# Patient Record
Sex: MALE | Race: WHITE | NOT HISPANIC OR LATINO | Employment: FULL TIME | URBAN - METROPOLITAN AREA
[De-identification: names, ages, dates, MRNs, and addresses within clinical notes are randomized per-mention and may not be internally consistent; named-entity substitution may affect disease eponyms.]

---

## 2019-05-06 ENCOUNTER — DOCUMENTATION ONLY (OUTPATIENT)
Dept: CARE COORDINATION | Facility: CLINIC | Age: 59
End: 2019-05-06

## 2019-05-06 ASSESSMENT — ENCOUNTER SYMPTOMS
DISTURBANCES IN COORDINATION: 0
NUMBNESS: 0
PARALYSIS: 0
SPEECH CHANGE: 0
LOSS OF CONSCIOUSNESS: 0
SMELL DISTURBANCE: 0
DIZZINESS: 0
SINUS CONGESTION: 1
TASTE DISTURBANCE: 0
SORE THROAT: 1
TINGLING: 0
TROUBLE SWALLOWING: 0
MEMORY LOSS: 1
HOARSE VOICE: 0
NECK MASS: 0
TREMORS: 0
SINUS PAIN: 0
WEAKNESS: 0
SEIZURES: 0
HEADACHES: 0

## 2019-05-08 ENCOUNTER — OFFICE VISIT (OUTPATIENT)
Dept: INTERNAL MEDICINE | Facility: CLINIC | Age: 59
End: 2019-05-08
Payer: COMMERCIAL

## 2019-05-08 VITALS
HEIGHT: 72 IN | BODY MASS INDEX: 26.68 KG/M2 | SYSTOLIC BLOOD PRESSURE: 144 MMHG | OXYGEN SATURATION: 97 % | WEIGHT: 197 LBS | DIASTOLIC BLOOD PRESSURE: 93 MMHG | HEART RATE: 68 BPM

## 2019-05-08 DIAGNOSIS — E78.5 HYPERLIPIDEMIA, UNSPECIFIED HYPERLIPIDEMIA TYPE: ICD-10-CM

## 2019-05-08 DIAGNOSIS — R06.83 SNORING: ICD-10-CM

## 2019-05-08 DIAGNOSIS — S06.0X0S CONCUSSION WITHOUT LOSS OF CONSCIOUSNESS, SEQUELA (H): Primary | ICD-10-CM

## 2019-05-08 ASSESSMENT — MIFFLIN-ST. JEOR: SCORE: 1745.46

## 2019-05-08 ASSESSMENT — PAIN SCALES - GENERAL: PAINLEVEL: NO PAIN (0)

## 2019-05-08 NOTE — PATIENT INSTRUCTIONS
[facility] Medication Refill Request Information:  * Please contact your pharmacy regarding ANY request for medication refills.  ** [facility] Prescription Fax = [phone]  * Please allow 3 business days for routine medication refills.  * Please allow 5 business days for controlled substance medication refills.     [facility] Test Result notification information:  *You will be notified with in 7-10 days of your appointment day regarding the results of your test.  If you are on MyChart you will be notified as soon as the provider has reviewed the results and signed off on them.    [facility]: [phone]

## 2019-05-08 NOTE — NURSING NOTE
Chief Complaint   Patient presents with     Establish Care     Pt is here to establish a new PCP.      Olga Daugherty LPN at 10:17 AM on 5/8/2019.

## 2019-05-08 NOTE — PROGRESS NOTES
HPI  58-year-old  presents today for evaluation of a concussion.  He was skiing in [place] 2 weeks ago and fell and had a head injury wearing a ski helmet.  He has about 5 hours of antegrade amnesia in another several hours of retrograde amnesia for the event.  He is seen in [facility] initial CT scan on one image showed a possible subarachnoid hemorrhage however repeat CT scan several hours later did not confirm this and MRI scan did not show any evidence of hemorrhage but did show some white matter changes.  Subsequently he has done well.  He has had a fatigue but he is also had a recent upper respiratory infection.  He is had improvement in the fatigue he has had no difficulty concentrating no headache no visual changes no focal neurologic symptoms numbness tingling weakness or other complaints.  Is been able to function near normally.  He otherwise has been in good health.  His wife complains of disruptive snoring.  He has no daytime hypersomnolence however he is never been observed to have apneic spells his neck measures only 16 inches by his report.  He is hypertensive today his stop bang score is 3.  He does have a history of borderline hypertension and hyperlipidemia in the past but this is been managed largely through exercise.  He has been doing good cardiovascular exercise biking and running to work several days a week.  Is been doing no strength training and we discussed this.  Past Medical History:   Diagnosis Date     Hyperlipidemia      Hypertension      Past Surgical History:   Procedure Laterality Date     NO HISTORY OF SURGERY       Family History   Problem Relation Age of Onset     Glaucoma No family hx of      Macular Degeneration No family hx of      Eye Surgery No family hx of      Retinal detachment No family hx of      Social History     Socioeconomic History     Marital status:      Spouse name: None     Number of children: None     Years of education: None     Highest  education level: None   Occupational History     None   Social Needs     Financial resource strain: None     Food insecurity:     Worry: None     Inability: None     Transportation needs:     Medical: None     Non-medical: None   Tobacco Use     Smoking status: Former Smoker     Types: Cigarettes     Last attempt to quit: 1984     Years since quittin.7     Smokeless tobacco: Never Used   Substance and Sexual Activity     Alcohol use: None     Drug use: None     Sexual activity: None   Lifestyle     Physical activity:     Days per week: None     Minutes per session: None     Stress: None   Relationships     Social connections:     Talks on phone: None     Gets together: None     Attends Sikh service: None     Active member of club or organization: None     Attends meetings of clubs or organizations: None     Relationship status: None     Intimate partner violence:     Fear of current or ex partner: None     Emotionally abused: None     Physically abused: None     Forced sexual activity: None   Other Topics Concern     None   Social History Narrative     None     Answers for HPI/ROS submitted by the patient on 2019   General Symptoms: No  Skin Symptoms: No  HENT Symptoms: Yes  EYE SYMPTOMS: No  HEART SYMPTOMS: No  LUNG SYMPTOMS: No  INTESTINAL SYMPTOMS: No  URINARY SYMPTOMS: No  REPRODUCTIVE SYMPTOMS: No  SKELETAL SYMPTOMS: No  BLOOD SYMPTOMS: No  NERVOUS SYSTEM SYMPTOMS: Yes  MENTAL HEALTH SYMPTOMS: No  Ear pain: No  Ear discharge: No  Hearing loss: No  Tinnitus: Yes  Nosebleeds: No  Congestion: Yes  Sinus pain: No  Trouble swallowing: No   Voice hoarseness: No  Mouth sores: No  Sore throat: Yes  Tooth pain: No  Gum tenderness: No  Bleeding gums: No  Change in taste: No  Change in sense of smell: No  Dry mouth: No  Hearing aid used: No  Neck lump: No  Trouble with coordination: No  Dizziness or trouble with balance: No  Fainting or black-out spells: No  Memory loss: Yes  Headache: No  Seizures:  "No  Speech problems: No  Tingling: No  Tremor: No  Weakness: No  Difficulty walking: No  Paralysis: No  Numbness: No    Exam:  BP (!) 144/93   Pulse 68   Ht 1.835 m (6' 0.24\")   Wt 89.4 kg (197 lb)   SpO2 97%   BMI 26.54 kg/m    197 lbs 0 oz  Physical Exam   The patient is alert, oriented with a clear sensorium.   Skin shows no lesions or rashes and good turgor.   Head is normocephalic and atraumatic.   Eyes show PERRLA with benign optic fundi.   Neck shows no nodes, thyromegaly or bruits.   Back is non tender.  Lungs are clear to percussion and auscultation.   Heart shows normal S1 and S2 without murmur or gallop.   Abdomen is soft and nontender without masses or organomegaly.   Extremities show no edema and no evidence of active synovitis.   Neurologic examination shows cranial nerves II-XII intact. Motor shows 5/5 strength. Reflexes are symmetric. Cerebellar testing shows normal tandem gait.  Romberg negative.    ASSESSMENT  1 concussion  2 questionable subarachnoid hemorrhage doubt this is a real finding  3 disruptive snoring  4 hypertension  5 history of hyperlipidemia needs follow-up    Plan  Him and have him see neurology and have them review his scans which will get loaded into our system.  We will defer any additional imaging or investigation regarding the purported hemorrhage to neurology.  We will have him see the sleep clinic regarding his snoring.  We discussed nonpharmacologic blood pressure control measures and will intensify his salt restriction his fruit and vegetable consumption and encouraged him to participate in a combination of strength training and cardiovascular exercise.  Encouraged him schedule physical examination following his above evaluations.  Over 45 minutes spent with patient the majority in counseling and coordinating care.    This note was completed using Dragon voice recognition software.  Although reviewed after completion, some word and grammatical errors may " occur.    Gus Estevez MD  General Internal Medicine  [facility]  [phone]

## 2019-05-11 DIAGNOSIS — E78.5 HYPERLIPIDEMIA, UNSPECIFIED HYPERLIPIDEMIA TYPE: ICD-10-CM

## 2019-05-11 LAB
CHOLEST SERPL-MCNC: 183 MG/DL
HDLC SERPL-MCNC: 40 MG/DL
LDLC SERPL CALC-MCNC: 115 MG/DL
NONHDLC SERPL-MCNC: 143 MG/DL
TRIGL SERPL-MCNC: 141 MG/DL

## 2019-06-14 ENCOUNTER — OFFICE VISIT (OUTPATIENT)
Dept: SLEEP MEDICINE | Facility: CLINIC | Age: 59
End: 2019-06-14
Payer: COMMERCIAL

## 2019-06-14 VITALS
SYSTOLIC BLOOD PRESSURE: 125 MMHG | HEIGHT: 73 IN | WEIGHT: 198 LBS | BODY MASS INDEX: 26.24 KG/M2 | HEART RATE: 67 BPM | DIASTOLIC BLOOD PRESSURE: 84 MMHG | RESPIRATION RATE: 16 BRPM | OXYGEN SATURATION: 97 %

## 2019-06-14 DIAGNOSIS — R06.83 SNORING: Primary | ICD-10-CM

## 2019-06-14 PROCEDURE — 99244 OFF/OP CNSLTJ NEW/EST MOD 40: CPT | Performed by: PHYSICIAN ASSISTANT

## 2019-06-14 ASSESSMENT — MIFFLIN-ST. JEOR: SCORE: 1749.06

## 2019-06-14 NOTE — PROGRESS NOTES
Sleep Consultation:    Date on this visit: 6/14/2019    Chris Kirkland is sent by Gus Estevez for a sleep consultation regarding snoring.    Primary Physician: Gus Estevez     Chris Kirkland reports his wife is bothered by his snoring for 5 years. His medical history is significant for elevated blood pressure and hyperlipidemia.    Chris goes to sleep at 10:30-11:00 PM during the week. He wakes up at 6:30 AM with an alarm. He occasionally hits the snooze. He falls asleep in 10 minutes.  Chris has difficulty falling asleep sporadically when work is on his mind.  He wakes up 1 time a night for 5 minutes before falling back to sleep.  Chris wakes up to go to the bathroom.  On weekends, Chris goes to sleep at 11:00 PM.  He wakes up at 7:00-8:00 AM without an alarm. He falls asleep in 10 minutes.  Patient gets an average of 7-8 hours of sleep per night. He feels refreshed when he wakes as long as he gets enough sleep.    Patient does use electronics in bed and read in bed and does not watch TV in bed. He sometimes worries in bed about work.     Chris does not do shift work.  He works day shifts.  He works as an . He lives with his wife.    Chris does snore almost every night and snoring is loud. Patient does have a regular bed partner. She goes to bed an hour before him. She often shoves him within 30 minutes of him going to bed.  He has tried Breathe Right strips. Although he feels he breathes better, his wife says it has not helped.  There is no report of gasping, snorting or witnessed apneas. They occasionally sleep separately due to his snoring.  Patient sleeps on his back and side. He starts on his right and ends up on his back.  He has no snort arousals, morning dry mouth, morning headaches, morning confusion and restless legs. Chris denies any bruxism, sleep walking, sleep talking, dream enactment, sleep paralysis, cataplexy and hypnogogic/hypnopompic  "hallucinations. His wife says he sometimes \"flops,\" but it is not clear what she means by that, possible changing positions.    Chris has difficulty breathing through his nose, denies claustrophobia, reflux at night, heartburn and depression.      Chris has gained 5-10 pounds in the last 4 years. He bikes or runs to work in the summer and runs/kayaks on weekends. He is less active in the winter. Patient describes themself as a night person.  He would prefer to go to sleep at 11:00 PM and wake up at 7:00-8:00 AM.  Patient's [place] Sleepiness score 3/24 inconsistent with daytime sleepiness.      Chris denies taking naps. He takes no inadvertant naps.  He denies dozing while driving. Patient was counseled on the importance of driving while alert, to pull over if drowsy, or nap before getting into the vehicle if sleepy.  He uses 3-4 cups/day of coffee. Last caffeine intake is usually before 3 PM.    Allergies:    Allergies   Allergen Reactions     Penicillins        Medications:    No current outpatient medications on file.       Problem List:  Patient Active Problem List    Diagnosis Date Noted     Hyperlipidemia      Priority: Medium     Cataracts, bilateral 06/09/2015     Priority: Medium     Myopic astigmatism of both eyes 06/09/2015     Priority: Medium     Presbyopia 06/09/2015     Priority: Medium        Past Medical/Surgical History:  Past Medical History:   Diagnosis Date     Hyperlipidemia      Hypertension      Past Surgical History:   Procedure Laterality Date     NO HISTORY OF SURGERY         Social History:  Social History     Socioeconomic History     Marital status:      Spouse name: Not on file     Number of children: Not on file     Years of education: Not on file     Highest education level: Not on file   Occupational History     Not on file   Social Needs     Financial resource strain: Not on file     Food insecurity:     Worry: Not on file     Inability: Not on file     Transportation " needs:     Medical: Not on file     Non-medical: Not on file   Tobacco Use     Smoking status: Former Smoker     Types: Cigarettes     Last attempt to quit: 1984     Years since quittin.8     Smokeless tobacco: Never Used   Substance and Sexual Activity     Alcohol use: Yes     Comment: 1 drink a few times per week, 2 on weekends     Drug use: Not Currently     Sexual activity: Not on file   Lifestyle     Physical activity:     Days per week: Not on file     Minutes per session: Not on file     Stress: Not on file   Relationships     Social connections:     Talks on phone: Not on file     Gets together: Not on file     Attends Christian service: Not on file     Active member of club or organization: Not on file     Attends meetings of clubs or organizations: Not on file     Relationship status: Not on file     Intimate partner violence:     Fear of current or ex partner: Not on file     Emotionally abused: Not on file     Physically abused: Not on file     Forced sexual activity: Not on file   Other Topics Concern     Parent/sibling w/ CABG, MI or angioplasty before 65F 55M? Not Asked   Social History Narrative     Not on file       Family History:  Family History   Problem Relation Age of Onset     Parkinsonism Mother      Cancer Mother         thyroid     Heart Failure Father      Sleep Apnea Brother      Obesity Brother      Diabetes Brother      Parkinsonism Sister      Diabetes Brother      Glaucoma No family hx of      Macular Degeneration No family hx of      Eye Surgery No family hx of      Retinal detachment No family hx of        Review of Systems:  A complete review of systems reviewed by me is negative with the exeption of what has been mentioned in the history of present illness.  CONSTITUTIONAL: NEGATIVE for weight gain/loss, fever, chills, sweats or night sweats, drug allergies.  EYES: NEGATIVE for changes in vision, blind spots, double vision.  ENT: NEGATIVE for ear pain, sore throat, sinus  "pain, post-nasal drip, runny nose, bloody nose  CARDIAC: NEGATIVE for fast heartbeats or fluttering in chest, chest pain or pressure, breathlessness when lying flat, swollen legs or swollen feet.  CARDIAC:  POSITIVE for  High blood pressure readings.  NEUROLOGIC: NEGATIVE headaches, weakness or numbness in the arms or legs.  DERMATOLOGIC: NEGATIVE for rashes, new moles or change in mole(s)  PULMONARY: NEGATIVE SOB at rest, SOB with activity, dry cough, productive cough, coughing up blood, wheezing or whistling when breathing.    GASTROINTESTINAL: NEGATIVE for nausea or vomitting, loose or watery stools, fat or grease in stools, constipation, abdominal pain, bowel movements black in color or blood noted.  GENITOURINARY: NEGATIVE for pain during urination, blood in urine, urinating more frequently than usual, irregular menstrual periods.  MUSCULOSKELETAL: NEGATIVE for muscle pain, bone or joint pain, swollen joints.  ENDOCRINE: NEGATIVE for increased thirst or urination, diabetes.  LYMPHATIC: NEGATIVE for swollen lymph nodes, lumps or bumps in the breasts or nipple discharge.    Physical Examination:  Vitals: /84   Pulse 67   Resp 16   Ht 1.842 m (6' 0.5\")   Wt 89.8 kg (198 lb)   SpO2 97%   BMI 26.48 kg/m      Neck Cir (cm): 40 cm    [place] Total Score 6/14/2019   Total score - [place] 3       [place]ORIA Total Score: 4 (06/14/19 1433)    GENERAL APPEARANCE: healthy, alert, no distress and cooperative  EYES: Eyes grossly normal to inspection, PERRL, conjunctivae and sclerae normal and lids and lashes normal  HENT: nose and mouth without ulcers or lesions, oropharynx crowded, soft palate very low draping and tongue base enlarged  NECK: no adenopathy, no asymmetry, masses, or scars, thyroid normal to palpation and trachea midline and normal to palpation  RESP: lungs clear to auscultation - no rales, rhonchi or wheezes  CV: regular rates and rhythm, normal S1 S2, no S3 or S4, no murmur, click or rub and no irregular " beats  LYMPHATICS: no cervical adenopathy  MS: extremities normal- no gross deformities noted  NEURO: Normal strength and tone, mentation intact, speech normal and cranial nerves 2-12 intact  Mallampati Class: IV.  Tonsillar Stage: 1  hidden by pillars.    Impression/Plan:    (R06.83) Snoring  (primary encounter diagnosis)  Comment: Mr. Kirkland presents primarily at his wife's request due to his disruptive snoring.  He snores most nights, but not every night.  He has 1-2 alcoholic beverages a few times per week.  He has not noticed to what degree that may be contributing.  His snoring is worse when he sleeps on his back.  He feels his nasal airway becomes restricted when he lays down.  Breathe Right strips have helped him breathe better, but had not seem to reduce his snoring.  He has not been observed to have pauses in breathing.  His STOP BANG score is at least 4, suggesting mild to moderately elevated risk for SHIREEN.  His risk factors include snoring, age >50 (59), neck circumference of 40 cm, male gender.  He also has occasional elevated blood pressure readings, although he has not been diagnosed or treated for hypertension.  His soft palate is low draping and approximates with his tongue.  Negative risk factors include; no significant sleepiness (ESS 3/24), no observed apnea, BMI <35 (26).   Plan: We discussed that his risk for apnea is elevated based on the STOP BANG score, even though he is not highly symptomatic.  We discussed options of evaluating his sleep with either an in lab PSG, home study, or oximetry.  He was more concerned with treating his snoring than pursuing a diagnosis of apnea.  He had some concern about risk of sudden death from untreated apnea, but was reassured that that risk is very low.  He would like to try using Flonase, positional restriction (he was shown the Slumberbump) or OTC dental appliance to see if that resolves the snoring before he undergoes further sleep  evaluation.      Literature provided regarding sleep apnea.      He will follow up with me in the future if he would like to undergo a sleep study.       Polysomnography reviewed.  Obstructive sleep apnea reviewed.  Complications of untreated sleep apnea were reviewed.  45 minutes was spent during this visit, over 50% in counseling and coordination of care.   Bennett Goltz, PA-C    CC: Gus Estevez

## 2019-06-14 NOTE — PATIENT INSTRUCTIONS
"MY TREATMENT INFORMATION FOR SLEEP APNEA-  Chris Kirkland    DOCTOR : Bennett Ezra Goltz, PA-C  SLEEP CENTER : [facility]     MY CONTACT NUMBER: [phone]    Am I having a sleep study at a sleep center?  Make sure you have an appointment for the study before you leave!    Am I having a home sleep study?  Watch this video:  https://www.Efield.com/watch?v=CteI_[email or web address]&[email or web address]  Please verify your insurance coverage with your insurance carrier    Frequently asked questions:  1. What is Obstructive Sleep Apnea (SHIREEN)? SHIREEN is the most common type of sleep apnea. Apnea means, \"without breath.\"  Apnea is most often caused by narrowing or collapse of the upper airway as muscles relax during sleep.   Almost everyone has occasional apneas. Most people with sleep apnea have had brief interruptions at night frequently for many years.  The severity of sleep apnea is related to how frequent and severe the events are.   2. What are the consequences of SHIREEN? Symptoms include: feeling sleepy during the day, snoring loudly, gasping or stopping of breathing, trouble sleeping, and occasionally morning headaches or heartburn at night.  Sleepiness can be serious and even increase the risk of falling asleep while driving. Other health consequences may include development of high blood pressure and other cardiovascular disease in persons who are susceptible. Untreated SHIREEN  can contribute to heart disease, stroke and diabetes.   3. What are the treatment options? In most situations, sleep apnea is a lifelong disease that must be managed with daily therapy. Medications are not effective for sleep apnea and surgery is generally not considered until other therapies have been tried. Your treatment is your choice . Continuous Positive Airway (CPAP) works right away and is the therapy that is effective in nearly everyone. An oral device to hold your jaw forward is usually the next most reliable option. Other options " include postioning devices (to keep you off your back), weight loss, and surgery including a tongue pacing device. There is more detail about some of these options below.    Important tips for using CPAP and similar devices   Know your equipment:  CPAP is continuous positive airway pressure that prevents obstructive sleep apnea by keeping the throat from collapsing while you are sleeping. In most cases, the device is  smart  and can slowly self-adjusts if your throat collapses and keeps a record every day of how well you are treated-this information is available to you and your care team.  BPAP is bilevel positive airway pressure that keeps your throat open and also assists each breath with a pressure boost to maintain adequate breathing.  Special kinds of BPAP are used in patients who have inadequate breathing from lung or heart disease. In most cases, the device is  smart  and can slowly self-adjusts to assist breathing. Like CPAP, the device keeps a record of how well you are treated.  Your mask is your connection to the device. You get to choose what feels most comfortable and the staff will help to make sure if fits. Here: are some examples of the different masks that are available:       Key points to remember on your journey with sleep apnea:  1. Sleep study.  PAP devices often need to be adjusted during a sleep study to show that they are effective and adjusted right.  2. Good tips to remember: Try wearing just the mask during a quiet time during the day so your body adapts to wearing it. A humidifier is recommended for comfort in most cases to prevent drying of your nose and throat. Allergy medication from your provider may help you if you are having nasal congestion.  3. Getting settled-in. It takes more than one night for most of us to get used to wearing a mask. Try wearing just the mask during a quiet time during the day so your body adapts to wearing it. A humidifier is recommended for comfort in most  cases. Our team will work with you carefully on the first day and will be in contact within 4 days and again at 2 and 4 weeks for advice and remote device adjustments. Your therapy is evaluated by the device each day.   4. Use it every night. The more you are able to sleep naturally for 7-8 hours, the more likely you will have good sleep and to prevent health risks or symptoms from sleep apnea. Even if you use it 4 hours it helps. Occasionally all of us are unable to use a medical therapy, in sleep apnea, it is not dangerous to miss one night.   5. Communicate. Call our skilled team on the number provided on the first day if your visit for problems that make it difficult to wear the device. Over 2 out of 3 patients can learn to wear the device long-term with help from our team. Remember to call our team or your sleep providers if you are unable to wear the device as we may have other solutions for those who cannot adapt to mask CPAP therapy. It is recommended that you sleep your sleep provider within the first 3 months and yearly after that if you are not having problems.   6. Use it for your health. We encourage use of CPAP masks during daytime quiet periods to allow your face and brain to adapt to the sensation of CPAP so that it will be a more natural sensation to awaken to at night or during naps. This can be very useful during the first few weeks or months of adapting to CPAP though it does not help medically to wear CPAP during wakefulness and  should not be used as a strategy just to meet guidelines.  7. Take care of your equipment. Make sure you clean your mask and tubing using directions every day and that your filter and mask are replaced as recommended or if they are not working.     BESIDES CPAP, WHAT OTHER THERAPIES ARE THERE?    Positioning Device  Positioning devices are generally used when sleep apnea is mild and only occurs on your back.This example shows a pillow that straps around the waist. It  may be appropriate for those whose sleep study shows milder sleep apnea that occurs primarily when lying flat on one's back. Preliminary studies have shown benefit but effectiveness at home may need to be verified by a home sleep test. These devices are generally not covered by medical insurance.  Examples of devices that maintain sleeping on the back to prevent snoring and mild sleep apnea.    Belt type body positioner  Http://SpePharmosa.com/    Electronic reminder  Http://nightshifttherapy.com/  Http://www.Angstro.Neocis.au/  Slumberbump      Oral Appliance  What is oral appliance therapy?  An oral appliance device fits on your teeth at night like a retainer used after having braces. The device is made by a specialized dentist and requires several visits over 1-2 months before a manufactured device is made to fit your teeth and is adjusted to prevent your sleep apnea. Once an oral device is working properly, snoring should be improved. A home sleep test may be recommended at that time if to determine whether the sleep apnea is adequately treated.       Some things to remember:  -Oral devices are often, but not always, covered by your medical insurance. Be sure to check with your insurance provider.   -If you are referred for oral therapy, you will be given a list of specialized dentists to consider or you may choose to visit the Web site of the American Academy of Dental Sleep Medicine  -Oral devices are less likely to work if you have severe sleep apnea or are extremely overweight.     More detailed information  An oral appliance is a small acrylic device that fits over the upper and lower teeth  (similar to a retainer or a mouth guard). This device slightly moves jaw forward, which moves the base of the tongue forward, opens the airway, improves breathing for effective treat snoring and obstructive sleep apnea in perhaps 7 out of 10 people .  The best working devices are custom-made by a dental device   after a mold is made of the teeth 1, 2, 3.  When is an oral appliance indicated?  Oral appliance therapy is recommended as a first-line treatment for patients with primary snoring, mild sleep apnea, and for patients with moderate sleep apnea who prefer appliance therapy to use of CPAP4, 5. Severity of sleep apnea is determined by sleep testing and is based on the number of respiratory events per hour of sleep.   How successful is oral appliance therapy?  The success rate of oral appliance therapy in patients with mild sleep apnea is 75-80% while in patients with moderate sleep apnea it is 50-70%. The chance of success in patients with severe sleep apnea is 40-50%. The research also shows that oral appliances have a beneficial effect on the cardiovascular health of SHIREEN patients at the same magnitude as CPAP therapy7.  Oral appliances should be a second-line treatment in cases of severe sleep apnea, but if not completely successful then a combination therapy utilizing CPAP plus oral appliance therapy may be effective. Oral appliances tend to be effective in a broad range of patients although studies show that the patients who have the highest success are females, younger patients, those with milder disease, and less severe obesity. 3, 6.   Finding a dentist that practices dental sleep medicine  Specific training is available through the American Academy of Dental Sleep Medicine for dentists interested in working in the field of sleep. To find a dentist who is educated in the field of sleep and the use of oral appliances, near you, visit the Web site of the American Academy of Dental Sleep Medicine.    References  1. Lizzeth et al. Objectively measured vs self-reported compliance during oral appliance therapy for sleep-disordered breathing. Chest 2013; 144(5): 3079-1648.  2. Stacy et al. Objective measurement of compliance during oral appliance therapy for sleep-disordered breathing. Thorax 2013; 68(1):  91-96.  3. Mary et al. Mandibular advancement devices in 620 men and women with SHIREEN and snoring: tolerability and predictors of treatment success. Chest 2004; 125: 0725-0074.  4. Juju et al. Oral appliances for snoring and SHIREEN: a review. Sleep 2006; 29: 244-262.  5. Mike et al. Oral appliance treatment for SHIREEN: an update. J Clin Sleep Med 2014; 10(2): 215-227.  6. Venus et al. Predictors of OSAH treatment outcome. J Dent Res 2007; 86: 6675-9559.  Weight Loss:    Weight loss is a long-term strategy that may improve sleep apnea in some patients.    Weight management is a personal decision and the decision should be based on your interest and the potential benefits.  If you are interested in exploring weight loss strategies, the following discussion covers the impact on weight loss on sleep apnea and the approaches that may be successful.    Being overweight does not necessarily mean you will have health consequences.  Those who have BMI over 35 or over 27 with existing medical conditions carries greater risk.   Weight loss decreases severity of sleep apnea in most people with obesity. For those with mild obesity who have developed snoring with weight gain, even 15-30 pound weight loss can improve and occasionally eliminate sleep apnea.  Structured and life-long dietary and health habits are necessary to lose weight and keep healthier weight levels.     Though there may be significant health benefits from weight loss, long-term weight loss is very difficult to achieve- studies show success with dietary management in less than 10% of people. In addition, substantial weight loss may require years of dietary control and may be difficult if patients have severe obesity. In these cases, surgical management may be considered.  Finally, older individuals who have tolerated obesity without health complications may be less likely to benefit from weight loss strategies.      Your BMI is Body mass index is  26.48 kg/m .  Weight management is a personal decision.  If you are interested in exploring weight loss strategies, the following discussion covers the approaches that may be successful. Body mass index (BMI) is one way to tell whether you are at a healthy weight, overweight, or obese. It measures your weight in relation to your height.  A BMI of 18.5 to 24.9 is in the healthy range. A person with a BMI of 25 to 29.9 is considered overweight, and someone with a BMI of 30 or greater is considered obese. More than two-thirds of American adults are considered overweight or obese.  Being overweight or obese increases the risk for further weight gain. Excess weight may lead to heart disease and diabetes.  Creating and following plans for healthy eating and physical activity may help you improve your health.  Weight control is part of healthy lifestyle and includes exercise, emotional health, and healthy eating habits. Careful eating habits lifelong are the mainstay of weight control. Though there are significant health benefits from weight loss, long-term weight loss with diet alone may be very difficult to achieve- studies show long-term success with dietary management in less than 10% of people. Attaining a healthy weight may be especially difficult to achieve in those with severe obesity. In some cases, medications, devices and surgical management might be considered.  What can you do?  If you are overweight or obese and are interested in methods for weight loss, you should discuss this with your provider.     Consider reducing daily calorie intake by 500 calories.     Keep a food journal.     Avoiding skipping meals, consider cutting portions instead.    Diet combined with exercise helps maintain muscle while optimizing fat loss. Strength training is particularly important for building and maintaining muscle mass. Exercise helps reduce stress, increase energy, and improves fitness. Increasing exercise without diet  control, however, may not burn enough calories to loose weight.       Start walking three days a week 10-20 minutes at a time    Work towards walking thirty minutes five days a week     Eventually, increase the speed of your walking for 1-2 minutes at time    In addition, we recommend that you review healthy lifestyles and methods for weight loss available through the National Institutes of Health patient information sites:  http://win.niddk.nih.gov/publications/index.htm    And look into health and wellness programs that may be available through your health insurance provider, employer, local community center, or chet club.    Weight management plan: Discussed healthy diet and exercise guidelines  Surgery:  Surgery for obstructive sleep apnea is considered generally only when other therapies fail to work. Surgery may be discussed with you if you are having a difficult time tolerating CPAP and or when there is an abnormal structure that requires surgical correction.  Nose and throat surgeries often enlarge the airway to prevent collapse.  Most of these surgeries create pain for 1-2 weeks and up to half of the most common surgeries are not effective throughout life.  You should carefully discuss the benefits and drawbacks to surgery with your sleep provider and surgeon to determine if it is the best solution for you.   More information  Surgery for SHIREEN is directed at areas that are responsible for narrowing or complete obstruction of the airway during sleep.  There are a wide range of procedures available to enlarge and/or stabilize the airway to prevent blockage of breathing in the three major areas where it can occur: the palate, tongue, and nasal regions.  Successful surgical treatment depends on the accurate identification of the factors responsible for obstructive sleep apnea in each person.  A personalized approach is required because there is no single treatment that works well for everyone.  Because of  anatomic variation, consultation with an examination by a sleep surgeon is a critical first step in determining what surgical options are best for each patient.  In some cases, examination during sedation may be recommended in order to guide the selection of procedures.  Patients will be counseled about risks and benefits as well as the typical recovery course after surgery. Surgery is typically not a cure for a person s SHIREEN.  However, surgery will often significantly improve one s SHIREEN severity (termed  success rate ).  Even in the absence of a cure, surgery will decrease the cardiovascular risk associated with OSA7; improve overall quality of life8 (sleepiness, functionality, sleep quality, etc).  Palate Procedures:  Patients with SHIREEN often have narrowing of their airway in the region of their tonsils and uvula.  The goals of palate procedures are to widen the airway in this region as well as to help the tissues resist collapse.  Modern palate procedure techniques focus on tissue conservation and soft tissue rearrangement, rather than tissue removal.  Often the uvula is preserved in this procedure. Residual sleep apnea is common in patient after pharyngoplasty with an average reduction in sleep apnea events of 33%2.    Tongue Procedures:  ExamWhile patients are awake, the muscles that surround the throat are active and keep this region open for breathing. These muscles relax during sleep, allowing the tongue and other structures to collapse and block breathing.  There are several different tongue procedures available.  Selection of a tongue base procedure depends on characteristics seen on physical exam.  Generally, procedures are aimed at removing bulky tissues in this area or preventing the back of the tongue from falling back during sleep.  Success rates for tongue surgery range from 50-62%3.  Hypoglossal Nerve Stimulation:  Hypoglossal nerve stimulation has recently received approval from the [place] Food  and Drug Administration for the treatment of obstructive sleep apnea.  This is based on research showing that the system was safe and effective in treating sleep apnea6.  Results showed that the median AHI score decreased 68%, from 29.3 to 9.0. This therapy uses an implant system that senses breathing patterns and delivers mild stimulation to airway muscles, which keeps the airway open during sleep.  The system consists of three fully implanted components: a small generator (similar in size to a pacemaker), a breathing sensor, and a stimulation lead.  Using a small handheld remote, a patient turns the therapy on before bed and off upon awakening.    Candidates for this device must be greater than 22 years of age, have moderate to severe SHIREEN (AHI between 20-65), BMI less than 32, have tried CPAP/oral appliance without success, and have appropriate upper airway anatomy (determined by a sleep endoscopy performed by Dr. Sidhu).  Hypoglossal Nerve Stimulation Pathway:    The sleep surgeon s office will work with the patient through the insurance prior-authorization process (including communications and appeals).    Nasal Procedures:  Nasal obstruction can interfere with nasal breathing during the day and night.  Studies have shown that relief of nasal obstruction can improve the ability of some patients to tolerate positive airway pressure therapy for obstructive sleep apnea1.  Treatment options include medications such as nasal saline, topical corticosteroid and antihistamine sprays, and oral medications such as antihistamines or decongestants. Non-surgical treatments can include external nasal dilators for selected patients. If these are not successful by themselves, surgery can improve the nasal airway either alone or in combination with these other options.  Combination Procedures:  Combination of surgical procedures and other treatments may be recommended, particularly if patients have more than one area of narrowing  or persistent positional disease.  The success rate of combination surgery ranges from 66-80%2,3.    References  1. Haroldo BENTON. The Role of the Nose in Snoring and Obstructive Sleep Apnoea: An Update.  Eur Arch Otorhinolaryngol. 2011; 268: 1365-73.  2.  Liudmila SM; Ofelia JA; Yudith JR; Pallanch JF; Gordo MB; Riccardo SG; Corky MORALES. Surgical modifications of the upper airway for obstructive sleep apnea in adults: a systematic review and meta-analysis. SLEEP 2010;33(10):3538-9899. Darshan ZAMORA. Hypopharyngeal surgery in obstructive sleep apnea: an evidence-based medicine review.  Arch Otolaryngol Head Neck Surg. 2006 Feb;132(2):206-13.  3. Nick YH1, Abhishek Y, Willian DEV. The efficacy of anatomically based multilevel surgery for obstructive sleep apnea. Otolaryngol Head Neck Surg. 2003 Oct;129(4):327-35.  4. Darshan ZAMORA, Goldberg A. Hypopharyngeal Surgery in Obstructive Sleep Apnea: An Evidence-Based Medicine Review. Arch Otolaryngol Head Neck Surg. 2006 Feb;132(2):206-13.  5. Mercy PJ et al. Upper-Airway Stimulation for Obstructive Sleep Apnea.  N Engl J Med. 2014 Jan 9;370(2):139-49.  6. Suman Y et al. Increased Incidence of Cardiovascular Disease in Middle-aged Men with Obstructive Sleep Apnea. Am J Respir Crit Care Med; 2002 166: 159-165  7. Mohamud EM et al. Studying Life Effects and Effectiveness of Palatopharyngoplasty (SLEEP) study: Subjective Outcomes of Isolated Uvulopalatopharyngoplasty. Otolaryngol Head Neck Surg. 2011; 144: 623-631.

## 2019-06-14 NOTE — NURSING NOTE
"Chief Complaint   Patient presents with     Sleep Problem     Snoring       Initial /84   Pulse 67   Resp 16   Ht 1.842 m (6' 0.5\")   Wt 89.8 kg (198 lb)   SpO2 97%   BMI 26.48 kg/m   Estimated body mass index is 26.48 kg/m  as calculated from the following:    Height as of this encounter: 1.842 m (6' 0.5\").    Weight as of this encounter: 89.8 kg (198 lb).    Medication Reconciliation: complete     ESS 3  Neck 40cm  Ilana Wood        "

## 2019-06-25 ENCOUNTER — TELEPHONE (OUTPATIENT)
Dept: PHYSICAL MEDICINE AND REHAB | Facility: CLINIC | Age: 59
End: 2019-06-25

## 2019-06-26 ENCOUNTER — OFFICE VISIT (OUTPATIENT)
Dept: PHYSICAL MEDICINE AND REHAB | Facility: CLINIC | Age: 59
End: 2019-06-26
Payer: COMMERCIAL

## 2019-06-26 VITALS
OXYGEN SATURATION: 95 % | HEART RATE: 61 BPM | SYSTOLIC BLOOD PRESSURE: 132 MMHG | BODY MASS INDEX: 25.55 KG/M2 | WEIGHT: 191 LBS | DIASTOLIC BLOOD PRESSURE: 88 MMHG

## 2019-06-26 DIAGNOSIS — R41.9: ICD-10-CM

## 2019-06-26 DIAGNOSIS — S06.9X0A MILD TRAUMATIC BRAIN INJURY, WITHOUT LOSS OF CONSCIOUSNESS, INITIAL ENCOUNTER (H): Primary | ICD-10-CM

## 2019-06-26 DIAGNOSIS — Z78.9: ICD-10-CM

## 2019-06-26 ASSESSMENT — ANXIETY QUESTIONNAIRES
4. TROUBLE RELAXING: NOT AT ALL
GAD7 TOTAL SCORE: 0
GAD7 TOTAL SCORE: 0
7. FEELING AFRAID AS IF SOMETHING AWFUL MIGHT HAPPEN: NOT AT ALL
2. NOT BEING ABLE TO STOP OR CONTROL WORRYING: NOT AT ALL
6. BECOMING EASILY ANNOYED OR IRRITABLE: NOT AT ALL
GAD7 TOTAL SCORE: 0
5. BEING SO RESTLESS THAT IT IS HARD TO SIT STILL: NOT AT ALL
3. WORRYING TOO MUCH ABOUT DIFFERENT THINGS: NOT AT ALL
7. FEELING AFRAID AS IF SOMETHING AWFUL MIGHT HAPPEN: NOT AT ALL
1. FEELING NERVOUS, ANXIOUS, OR ON EDGE: NOT AT ALL

## 2019-06-26 ASSESSMENT — PAIN SCALES - GENERAL: PAINLEVEL: NO PAIN (0)

## 2019-06-26 ASSESSMENT — PATIENT HEALTH QUESTIONNAIRE - PHQ9
SUM OF ALL RESPONSES TO PHQ QUESTIONS 1-9: 0
SUM OF ALL RESPONSES TO PHQ QUESTIONS 1-9: 0

## 2019-06-26 NOTE — NURSING NOTE
Chief Complaint   Patient presents with     Head Injury     concussion w/LOC 4/7/2019- Ski accident       Vitals:    06/26/19 0657   BP: 132/88   Pulse: 61   SpO2: 95%   Weight: 86.6 kg (191 lb)       Body mass index is 25.55 kg/m .      CONCUSSION SYMPTOMS ASSESSMENT 6/26/2019   Headache or Pressure In Head 0 - none   Upset Stomach or Throwing Up 0 - none   Problems with Balance 0 - none   Feeling Dizzy 0 - none   Sensitivity to Light 0 - none   Sensitivity to Noise 2 - mild to moderate   Mood Changes 0 - none   Feeling sluggish, hazy, or foggy 0 - none   Trouble Concentrating, Lack of Focus 0 - none   Motion Sickness 0 - none   Vision Changes 0 - none   Memory Problems 0 - none   Feeling Confused 0 - none   Neck Pain 0 - none   Trouble Sleeping 0 - none   Total Number of Symptoms 1   Symptom Severity Score 2     MONIQUE-7 SCORE 6/26/2019   Total Score 0 (minimal anxiety)   Total Score 0     PHQ-9 SCORE 6/26/2019   PHQ-9 Total Score MyChart 0   PHQ-9 Total Score 0

## 2019-06-26 NOTE — PROGRESS NOTES
"[facility] PM&R Clinic - New Patient Note   Chris Kirkland  [id number]  Date of Evaluation: 6/26/2019  Referring Physician: Dr. Estevez  Reason for consult: Concussion  HPI:  Chris Kirkland is a 58 y/o M with a PMH of HLD and HTN who presents to the PM&R clinic today for evaluation of a head injury.  Accident occurred in early April during ski trip in [place].  He says he recalls loading the van to drive to the ski [place] in [facility] from [place], and then has no recollection until after the incident.  Per report he was on the second run of the day when he fell, although the incident itself was unwitnessed.  Was wearing a helmet.  Time of retrograde amnesia was about 3 hours.   He was talking after the incident and doubts any LOC occurred.   He was evaluated by  and was about to be released, however he appeared \"wobbly\" on his feet upon standing so he was then taken down the hill on a sled and taken to a local hospital.  His first recollection after the injury was in the hospital, a period of about 5-6 hours of anterograde amnesia.  A CT head was performed which showed a probable  R SAH, but repeat showed \"decreased conspicuity of the previously seen SAH\".  An MRI was then done which did not demonstrate any evidence of bleeding, but did show trace supratentorial white matter disease which was nonspecific and likely related to chronic microvascular ischemic gliosis.  He stayed in the hospital overnight for observation and says a repeat image was recommended in the future to follow up.  His main reason for the visit today is to evaluate if repeat imaging is required.    After the injury Mr. Kirkland reported high levels of fatigue, and cognitive slowing.  He is an  and returned to work shortly after, but did notice the speed of his work was slower.  However he has progressed very well, and currently denies any residual symptoms related to his injury.  Feels back to baseline.  " Denies HAs, neck pain, balance problems, photophobia, visual changes, cognitive deficits, depression, anxiety, increased irritability, or any other symptom.  He does report tinnitus, however this is chronic and not any worse than baseline.  He reports no difficulty in concentration or ability to perform work duties/cases.      PMH:  Past Medical History:   Diagnosis Date     Hyperlipidemia      Hypertension      PSH:  Past Surgical History:   Procedure Laterality Date     NO HISTORY OF SURGERY       Allergies:  Allergies   Allergen Reactions     Penicillins      Medications:  No current outpatient medications on file.     No current facility-administered medications for this visit.      Social History:  Social History     Socioeconomic History     Marital status:      Spouse name: Not on file     Number of children: Not on file     Years of education: Not on file     Highest education level: Not on file   Occupational History     Not on file   Social Needs     Financial resource strain: Not on file     Food insecurity:     Worry: Not on file     Inability: Not on file     Transportation needs:     Medical: Not on file     Non-medical: Not on file   Tobacco Use     Smoking status: Former Smoker     Types: Cigarettes     Last attempt to quit: 1984     Years since quittin.8     Smokeless tobacco: Never Used   Substance and Sexual Activity     Alcohol use: Yes     Comment: 1 drink a few times per week, 2 on weekends     Drug use: Not Currently     Sexual activity: Not on file   Lifestyle     Physical activity:     Days per week: Not on file     Minutes per session: Not on file     Stress: Not on file   Relationships     Social connections:     Talks on phone: Not on file     Gets together: Not on file     Attends Roman [employer] service: Not on file     Active member of club or organization: Not on file     Attends meetings of clubs or organizations: Not on file     Relationship status: Not on file      Intimate partner violence:     Fear of current or ex partner: Not on file     Emotionally abused: Not on file     Physically abused: Not on file     Forced sexual activity: Not on file   Other Topics Concern     Parent/sibling w/ CABG, MI or angioplasty before 65F 55M? Not Asked   Social History Narrative     Not on file     Review of Systems - History obtained from chart review and the patient  General ROS: negative for - chills, fatigue or fever  Psychological ROS: negative for - anxiety, concentration difficulties or depression  Ophthalmic ROS: negative for - decreased vision or loss of vision  ENT ROS: positive for - tinnitus  Respiratory ROS: no cough, shortness of breath, or wheezing  Cardiovascular ROS: no chest pain or dyspnea on exertion  Gastrointestinal ROS: no abdominal pain, change in bowel habits, or black or bloody stools  Genito-Urinary ROS: no dysuria, trouble voiding, or hematuria  Musculoskeletal ROS: negative for - gait disturbance, muscle pain or muscular weakness  Neurologic ROS: Negative for HAs, focal deficits  Dermatological ROS: negative for rash     Functional Hx:  Independent for ambulation, mobility, ADLs, and IADLs  Physical Exam:  /88   Pulse 61   Wt 86.6 kg (191 lb)   SpO2 95%   BMI 25.55 kg/m    Gen: NAD, pleasant and cooperative  Skin: No rashes noted  HEENT: NC/AT  Pulm: Non-labored breathing  GI: Soft, NT/ND  Ext: No LE Edema, no calf tenderness  Neuro: AAOx3, follows commands, answers appropriately  MMT: 5/5 to b/l UE and LEs  SILT to all extremities  CNs II-XII normal  Gait normal, able to tandem walk  Romberg negative  Able to stand heel-toe for 12 seconds with eyes closed  Imaging:  CT Head (4/7/2019)  IMPRESSION:  1. Probable small volume right-sided subarachnoid hemorrhage as  detailed above. Consider short-term follow-up head CT in 6 hours if  felt to be indicated.  CT C-Spine (4/7/19)  IMPRESSION:  1. No acute fracture.  2. Mild reversal the normal cervical  lordosis at C3-C4.  3. Multilevel degenerative disc disease with severe degenerative  changes at C3-4, C4-5 and C5-6.  rCT Head (4/7/19)  BRAIN: No atrophy. There is decreased conspicuity of the previously  seen subarachnoid hemorrhage. No new sites of hemorrhage are seen.  Focal hypodensity is seen in the left basal ganglia, either chronic  lacunar infarction or prominent perivascular space. No mass effect or  hydrocephalus. No CT evidence of infarction.  MRI Brain (4/8/19)  1. Trace supratentorial white matter disease of the right cerebral  hemisphere, nonspecific but compatible with chronic microvascular  ischemic gliosis.  2. Otherwise negative brain MRI.  Assessment:  Chris Kirkland is a 58 y/o M with a PMH of HLD and HTN who presents to the PM&R clinic today for evaluation of a head injury due to a ski accident which occurred on 4/7/19 [place].  Based on information, can be classified as a mild TBI.  Symptoms have now resolved.   Recommendations:  -I am happy to hear Mr. Kirkland has made a full recovery from his mild TBI.  We discussed the natural history of these types of injuries, and it is not unusual to have a full recovery at this time frame after his incident.      -Given how well Mr. Kirkland is doing, I do not think repeat imaging is needed.  If he notices any new issues from a physical, cognitive, or psychiatric domain then we could consider one then.    -May return to clinic on an as needed basis only    Corbin Chávez MD  Department of Rehabilitation Medicine  Pager: [phone]  Time Spent on this Encounter   I, Corbin Chávez, spent a total of 30 minutes in the clinic today managing the care of Chris Kirkland.  Over 50% of my time on the unit was spent counseling the patient and /or coordinating care. See note for details.      Answers for HPI/ROS submitted by the patient on 6/26/2019   PHQ9 TOTAL SCORE: 0  MONIQUE 7 TOTAL SCORE: 0

## 2019-06-26 NOTE — PATIENT INSTRUCTIONS
"Thank you for allowing us to be apart of your care.      GENERAL ADVICE  ~ Gradually ease back into your usual activities.  ~ Rest as needed to help your symptoms go away.   - Consider pairing 50 minutes of activity with 10 minutes of rest.  ~ Allow yourself more time for activities.  ~ Write things down.  At home, at work, whenever there is something that you should remember, even it is simple.    SCREENS  ~ Change settings on your phone and computer using the \"Blue Light Filter\" (Night Shift on all [employer] products)   ~ The goal is making screens more yellow and less blue.     ~ If this is not an option you can download this program: ActiveSec, to adjust your screen resolution.  ~ Turn screen brightness down  ~ Increase font size      NECK PAIN  ~ Ice or Heat are good~  ~ Massage is ok if it doesn't trigger more symptoms~  ~ Gentle stretches and range-of-motion are helpful    FATIGUE  ~ Daily exercise is strongly encouraged.  Start with a 10 min walk and increase the time as tolerated until you are walking 30 minutes per day.      ANXIETY OR MOOD SWINGS:  ~ If you are irritable or anxious, take a break in a quiet room.  ~ Try using the free Calm scotty for guided breathing and mindfulness/meditation.  ~ Explore Communicado (https://www.headspace.com) for free and easy-to-use meditation guidance.    Diet:  - In principle incorporate more protein, lots of veggies, some fruit, whole grains.    - Less sweets and saturated fat.   - Mediterranean Diet is an easy-to-follow example.  ~ Drink plenty of water throughout the day (8-10 glasses per day)          [facility]  Nurse Care Coordinator # [phone]  Scheduling- # [phone]  Fax #[phone]    "

## 2019-06-26 NOTE — LETTER
"6/26/2019       RE: Chris Kirkland  [address]  [facility] [address]     Dear Colleague,    Thank you for referring your patient, Chris Kirkland, to the [facility] PHYSICAL MEDICINE AND REHABILITATION at [facility]. Please see a copy of my visit note below.    [facility] PM&R Clinic - New Patient Note   Chris Kirkland  [id number]  Date of Evaluation: 6/26/2019  Referring Physician: Dr. Estevez  Reason for consult: Concussion  HPI:  Chris Kirkland is a 58 y/o M with a PMH of HLD and HTN who presents to the PM&R clinic today for evaluation of a head injury.  Accident occurred in early April during ski trip in [place].  He says he recalls loading the van to drive to the [facility] in [facility] from [place], and then has no recollection until after the incident.  Per report he was on the second run of the day when he fell, although the incident itself was unwitnessed.  Was wearing a helmet.  Time of retrograde amnesia was about 3 hours.   He was talking after the incident and doubts any LOC occurred.   He was evaluated by  and was about to be released, however he appeared \"wobbly\" on his feet upon standing so he was then taken down the hill on a sled and taken to a local hospital.  His first recollection after the injury was in the hospital, a period of about 5-6 hours of anterograde amnesia.  A CT head was performed which showed a probable  R SAH, but repeat showed \"decreased conspicuity of the previously seen SAH\".  An MRI was then done which did not demonstrate any evidence of bleeding, but did show trace supratentorial white matter disease which was nonspecific and likely related to chronic microvascular ischemic gliosis.  He stayed in the hospital overnight for observation and says a repeat image was recommended in the future to follow up.  His main reason for the visit today is to evaluate if repeat imaging is required.    After the " injury Mr. Kirkland reported high levels of fatigue, and cognitive slowing.  He is an  and returned to work shortly after, but did notice the speed of his work was slower.  However he has progressed very well, and currently denies any residual symptoms related to his injury.  Feels back to baseline.  Denies HAs, neck pain, balance problems, photophobia, visual changes, cognitive deficits, depression, anxiety, increased irritability, or any other symptom.  He does report tinnitus, however this is chronic and not any worse than baseline.  He reports no difficulty in concentration or ability to perform work duties/cases.      PMH:  Past Medical History:   Diagnosis Date     Hyperlipidemia      Hypertension      PSH:  Past Surgical History:   Procedure Laterality Date     NO HISTORY OF SURGERY       Allergies:  Allergies   Allergen Reactions     Penicillins      Medications:  No current outpatient medications on file.     No current facility-administered medications for this visit.      Social History:  Social History     Socioeconomic History     Marital status:      Spouse name: Not on file     Number of children: Not on file     Years of education: Not on file     Highest education level: Not on file   Occupational History     Not on file   Social Needs     Financial resource strain: Not on file     Food insecurity:     Worry: Not on file     Inability: Not on file     Transportation needs:     Medical: Not on file     Non-medical: Not on file   Tobacco Use     Smoking status: Former Smoker     Types: Cigarettes     Last attempt to quit: 1984     Years since quittin.8     Smokeless tobacco: Never Used   Substance and Sexual Activity     Alcohol use: Yes     Comment: 1 drink a few times per week, 2 on weekends     Drug use: Not Currently     Sexual activity: Not on file   Lifestyle     Physical activity:     Days per week: Not on file     Minutes per session: Not on file     Stress: Not on file    Relationships     Social connections:     Talks on phone: Not on file     Gets together: Not on file     Attends Zoroastrian service: Not on file     Active member of club or organization: Not on file     Attends meetings of clubs or organizations: Not on file     Relationship status: Not on file     Intimate partner violence:     Fear of current or ex partner: Not on file     Emotionally abused: Not on file     Physically abused: Not on file     Forced sexual activity: Not on file   Other Topics Concern     Parent/sibling w/ CABG, MI or angioplasty before 65F 55M? Not Asked   Social History Narrative     Not on file     Review of Systems - History obtained from chart review and the patient  General ROS: negative for - chills, fatigue or fever  Psychological ROS: negative for - anxiety, concentration difficulties or depression  Ophthalmic ROS: negative for - decreased vision or loss of vision  ENT ROS: positive for - tinnitus  Respiratory ROS: no cough, shortness of breath, or wheezing  Cardiovascular ROS: no chest pain or dyspnea on exertion  Gastrointestinal ROS: no abdominal pain, change in bowel habits, or black or bloody stools  Genito-Urinary ROS: no dysuria, trouble voiding, or hematuria  Musculoskeletal ROS: negative for - gait disturbance, muscle pain or muscular weakness  Neurologic ROS: Negative for HAs, focal deficits  Dermatological ROS: negative for rash     Functional Hx:  Independent for ambulation, mobility, ADLs, and IADLs  Physical Exam:  /88   Pulse 61   Wt 86.6 kg (191 lb)   SpO2 95%   BMI 25.55 kg/m     Gen: NAD, pleasant and cooperative  Skin: No rashes noted  HEENT: NC/AT  Pulm: Non-labored breathing  GI: Soft, NT/ND  Ext: No LE Edema, no calf tenderness  Neuro: AAOx3, follows commands, answers appropriately  MMT: 5/5 to b/l UE and LEs  SILT to all extremities  CNs II-XII normal  Gait normal, able to tandem walk  Romberg negative  Able to stand heel-toe for 12 seconds with eyes  closed  Imaging:  CT Head (4/7/2019)  IMPRESSION:  1. Probable small volume right-sided subarachnoid hemorrhage as  detailed above. Consider short-term follow-up head CT in 6 hours if  felt to be indicated.  CT C-Spine (4/7/19)  IMPRESSION:  1. No acute fracture.  2. Mild reversal the normal cervical lordosis at C3-C4.  3. Multilevel degenerative disc disease with severe degenerative  changes at C3-4, C4-5 and C5-6.  rCT Head (4/7/19)  BRAIN: No atrophy. There is decreased conspicuity of the previously  seen subarachnoid hemorrhage. No new sites of hemorrhage are seen.  Focal hypodensity is seen in the left basal ganglia, either chronic  lacunar infarction or prominent perivascular space. No mass effect or  hydrocephalus. No CT evidence of infarction.  MRI Brain (4/8/19)  1. Trace supratentorial white matter disease of the right cerebral  hemisphere, nonspecific but compatible with chronic microvascular  ischemic gliosis.  2. Otherwise negative brain MRI.  Assessment:  Chris Kirkland is a 58 y/o M with a PMH of HLD and HTN who presents to the PM&R clinic today for evaluation of a head injury due to a ski accident which occurred on 4/7/19 [place].  Based on information, can be classified as a mild TBI.  Symptoms have now resolved.   Recommendations:  -I am happy to hear Mr. Kirkland has made a full recovery from his mild TBI.  We discussed the natural history of these types of injuries, and it is not unusual to have a full recovery at this time frame after his incident.      -Given how well Mr. Kirkland is doing, I do not think repeat imaging is needed.  If he notices any new issues from a physical, cognitive, or psychiatric domain then we could consider one then.    -May return to clinic on an as needed basis only    Corbin Chávez MD  Department of Rehabilitation Medicine  Pager: [phone]  Time Spent on this Encounter   I, Corbin Chávez, spent a total of 30 minutes in the clinic today managing the care of Chris  DARLING Kirkland.  Over 50% of my time on the unit was spent counseling the patient and /or coordinating care. See note for details.

## 2019-06-27 ASSESSMENT — ANXIETY QUESTIONNAIRES: GAD7 TOTAL SCORE: 0

## 2019-06-27 ASSESSMENT — PATIENT HEALTH QUESTIONNAIRE - PHQ9: SUM OF ALL RESPONSES TO PHQ QUESTIONS 1-9: 0

## 2019-11-12 ENCOUNTER — OFFICE VISIT (OUTPATIENT)
Dept: INTERNAL MEDICINE | Facility: CLINIC | Age: 59
End: 2019-11-12
Payer: COMMERCIAL

## 2019-11-12 VITALS
WEIGHT: 201.4 LBS | TEMPERATURE: 97.3 F | DIASTOLIC BLOOD PRESSURE: 100 MMHG | HEART RATE: 65 BPM | SYSTOLIC BLOOD PRESSURE: 157 MMHG | BODY MASS INDEX: 27.28 KG/M2 | OXYGEN SATURATION: 99 % | HEIGHT: 72 IN

## 2019-11-12 DIAGNOSIS — Z12.11 SPECIAL SCREENING FOR MALIGNANT NEOPLASMS, COLON: ICD-10-CM

## 2019-11-12 DIAGNOSIS — Z91.89 AT RISK FOR CORONARY ARTERY DISEASE: Primary | ICD-10-CM

## 2019-11-12 ASSESSMENT — MIFFLIN-ST. JEOR: SCORE: 1758.54

## 2019-11-12 ASSESSMENT — PAIN SCALES - GENERAL: PAINLEVEL: NO PAIN (0)

## 2019-11-12 NOTE — PATIENT INSTRUCTIONS
[facility] Medication Refill Request Information:  * Please contact your pharmacy regarding ANY request for medication refills.  ** [facility] Prescription Fax = [phone]  * Please allow 3 business days for routine medication refills.  * Please allow 5 business days for controlled substance medication refills.     [facility] Test Result notification information:  *You will be notified with in 7-10 days of your appointment day regarding the results of your test.  If you are on MyChart you will be notified as soon as the provider has reviewed the results and signed off on them.    [facility] Center: [phone]       Your health care Provider has recommended that you receive the new Shingle vaccine called Shingrix to prevent shingles for ages 50 and above. Many private insurance and Medicare Part D plans cover Shingrix. However, not all insurance carriers cover the entire cost of the Shingrix vaccine if the vaccine is administered at your primary care clinic. The clinic cannot determine your insurance benefits.  Please call your insurance carrier prior. The vaccine comes in two doses. Your second dose should be 2-6 months from your first dose.       Prior to receiving the vaccine, we recommend that you call your insurance carrier and ask them the following questions:            1. Is there a cost difference if I receive the vaccine at my doctor's office or a pharmacy?          2. Does my insurance cover the Shingrix Vaccine and administration of the vaccine?          3. What is my co-pay or deductible for the vaccine?        Please call to schedule a Nurse-Visit only at [phone].  Nurse Visit hours are available Monday, Wednesday, and Friday from 9:00 AM-11:00 AM and 1:00 PM-3:00 PM.

## 2019-11-12 NOTE — NURSING NOTE
Chief Complaint   Patient presents with     Physical     pt here for physical       Bryanna Matta CMA at 3:05 PM on 11/12/2019.

## 2019-11-12 NOTE — PROGRESS NOTES
Answers for HPI/ROS submitted by the patient on 11/8/2019   General Symptoms: No  Skin Symptoms: No  HENT Symptoms: No  EYE SYMPTOMS: No  HEART SYMPTOMS: No  LUNG SYMPTOMS: No  INTESTINAL SYMPTOMS: No  URINARY SYMPTOMS: No  REPRODUCTIVE SYMPTOMS: No  SKELETAL SYMPTOMS: No  BLOOD SYMPTOMS: No  NERVOUS SYSTEM SYMPTOMS: No  MENTAL HEALTH SYMPTOMS: No    The 10-year ASCVD risk score (Reggie ANGUIANO Jr., et al., 2013) is: 14.1%    Values used to calculate the score:      Age: 59 years      Sex: Male      Is Non- : No      Diabetic: No      Tobacco smoker: No      Systolic Blood Pressure: 157 mmHg      Is BP treated: No      HDL Cholesterol: 40 mg/dL      Total Cholesterol: 183 mg/dL    HPI  59-year-old  presents today for physical examination.  He has been doing well.  He continues to run for exercise he is tolerates this well he runs up to 6 miles to work and has had no symptoms or problems in association with this.  He has not had any associated dizziness lightheadedness palpitations or other complaints.  He had several questions regarding statins and PSA which we discussed.  We decided to pursue CT angiogram and coronary calcium scoring to further define his coronary risk and then decide regarding statin use.  He also is due for a colonoscopy and will get him set up for this as well.  Otherwise he is been feeling well.  He has not been monitoring his blood pressure at home he is exercising regularly he is following intermittently vegetarian diet he is cut down on his salt but not eliminated salt he is sleeping well he does not have any problems with daytime hypersomnolence although he snores there is been no apnea or concerns regarding daytime sleepiness.  He was seen by the sleep clinic and further investigation was deferred.  Past Medical History:   Diagnosis Date     Hyperlipidemia      Hypertension      Past Surgical History:   Procedure Laterality Date     NO HISTORY OF SURGERY        Family History   Problem Relation Age of Onset     Parkinsonism Mother      Cancer Mother         thyroid     Heart Failure Father      Sleep Apnea Brother      Obesity Brother      Diabetes Brother      Parkinsonism Sister      Diabetes Brother      Glaucoma No family hx of      Macular Degeneration No family hx of      Eye Surgery No family hx of      Retinal detachment No family hx of      Social History     Socioeconomic History     Marital status:      Spouse name: None     Number of children: None     Years of education: None     Highest education level: None   Occupational History     None   Social Needs     Financial resource strain: None     Food insecurity:     Worry: None     Inability: None     Transportation needs:     Medical: None     Non-medical: None   Tobacco Use     Smoking status: Former Smoker     Types: Cigarettes     Last attempt to quit: 1984     Years since quittin.2     Smokeless tobacco: Never Used   Substance and Sexual Activity     Alcohol use: Yes     Comment: 1 drink a few times per week, 2 on weekends     Drug use: Not Currently     Sexual activity: None   Lifestyle     Physical activity:     Days per week: None     Minutes per session: None     Stress: None   Relationships     Social connections:     Talks on phone: None     Gets together: None     Attends Druze service: None     Active member of club or organization: None     Attends meetings of clubs or organizations: None     Relationship status: None     Intimate partner violence:     Fear of current or ex partner: None     Emotionally abused: None     Physically abused: None     Forced sexual activity: None   Other Topics Concern     Parent/sibling w/ CABG, MI or angioplasty before 65F 55M? Not Asked   Social History Narrative     None       Exam:  BP (!) 157/100 (BP Location: Right arm, Patient Position: Sitting, Cuff Size: Adult Regular)   Pulse 65   Temp 97.3  F (36.3  C) (Oral)   Ht 1.832 m (6'  "0.13\")   Wt 91.4 kg (201 lb 6.4 oz)   SpO2 99%   BMI 27.22 kg/m    201 lbs 6.4 oz  Physical Exam   The patient is alert, oriented with a clear sensorium.   Skin shows no lesions or rashes and good turgor.   Head is normocephalic and atraumatic.   Eyes show PERRLA with poorly seen optic fundi.   Ears show normal TMs bilaterally.   Mouth shows clear oral mucosa.   Neck shows no nodes, thyromegaly or bruits.   Back is non tender.  Lungs are clear to percussion and auscultation.   Heart shows normal S1 and S2 without murmur or gallop.   Abdomen is soft and nontender without masses or organomegaly.   Genitalia show normal testes. No evidence of inguinal hernia.  Rectal shows moderate enlarged smooth prostate without nodules or masses.  Extremities show no edema and no evidence of active synovitis.   Neurologic examination shows cranial nerves II-XII intact. Motor shows 5/5 strength. Reflexes are symmetric. Cerebellar testing shows normal tandem gait.  Romberg negative.      ASSESSMENT  1 hypertension  2 hyperlipidemia with elevated ASCVD risk  3 BPH  Plan  We discussed his blood pressure.  We discussed nonpharmacologic blood pressure control measures and will try and avoid daily alcohol and further reduce his salt intake.  Skin to get a blood pressure monitor at home and we will do this rather than ambulatory blood pressure monitoring which we also discussed.  We discussed the desire to see his blood pressure at 130/80.  Organ to get his colonoscopy we discussed statins and as he is reluctant he did concede to a CT coronary angiogram and will see what that shows.  Otherwise we will have him follow-up with a blood pressure check if any on his home blood pressure monitors.  Over 40 minutes spent with patient the majority in counseling and coordinating care.      This note was completed using Dragon voice recognition software.  Although reviewed after completion, some word and grammatical errors may occur.    Gus SHERWOOD" MD Her[facility]  General Internal Medicine  [facility]  [phone]

## 2019-11-13 ENCOUNTER — TELEPHONE (OUTPATIENT)
Dept: GASTROENTEROLOGY | Facility: CLINIC | Age: 59
End: 2019-11-13

## 2019-11-18 ENCOUNTER — TELEPHONE (OUTPATIENT)
Dept: GASTROENTEROLOGY | Facility: CLINIC | Age: 59
End: 2019-11-18

## 2019-11-18 DIAGNOSIS — Z12.11 SPECIAL SCREENING FOR MALIGNANT NEOPLASMS, COLON: Primary | ICD-10-CM

## 2019-11-18 RX ORDER — BISACODYL 5 MG/1
10 TABLET, DELAYED RELEASE ORAL DAILY
Qty: 4 TABLET | Refills: 0 | Status: SHIPPED | OUTPATIENT
Start: 2019-11-18 | End: 2019-11-20

## 2019-11-18 NOTE — TELEPHONE ENCOUNTER
Patient Name: Chris Kirkland   : 1960  MRN: [id number]       : [x] N/A        Patient scheduled for:  [x] Colonoscopy      Indication for procedure. [x] Screening       Sedation Type: [x] Conscious Sedation       Procedure Provider:  Sj      Referring Provider. Herb    Arrival time verified: . 1415    Facility location verified:   [x][address], 5th floor       Prep Type:   [x][facility] eRx: [id number] [address]    Anticoagulants or blood thinners: [x]None               Electronic implanted devices: [x] No      H&P / Pre op physical completed:     Additional Information: Anahi  _______________________________________________      Instructions given: [x] Rec'd & Read   [x] Reviewed         Pre procedure teaching completed: [x] Yes - Reviewed    [x] No questions regarding Sedation as ordered    Transportation from procedure & responsible adult to be with patient following procedure for a minimum of 6 hrs (Conscious Sedation) 24 hrs (MAC): [x] Yes Antonietta - confirmed will have post-procedure companionship as required    Eri Lopez, RN, RN  [facility]

## 2019-11-20 ENCOUNTER — HOSPITAL ENCOUNTER (OUTPATIENT)
Dept: CT IMAGING | Facility: CLINIC | Age: 59
Discharge: HOME | End: 2019-11-20
Attending: INTERNAL MEDICINE | Admitting: INTERNAL MEDICINE
Payer: COMMERCIAL

## 2019-11-20 VITALS
DIASTOLIC BLOOD PRESSURE: 83 MMHG | RESPIRATION RATE: 16 BRPM | SYSTOLIC BLOOD PRESSURE: 124 MMHG | OXYGEN SATURATION: 95 %

## 2019-11-20 DIAGNOSIS — Z91.89 AT RISK FOR CORONARY ARTERY DISEASE: ICD-10-CM

## 2019-11-20 PROCEDURE — 0503T CT FFR: CPT

## 2019-11-20 PROCEDURE — 75574 CT ANGIO HRT W/3D IMAGE: CPT

## 2019-11-20 PROCEDURE — 0504T CT FFR: CPT | Mod: GC | Performed by: INTERNAL MEDICINE

## 2019-11-20 PROCEDURE — 75574 CT ANGIO HRT W/3D IMAGE: CPT | Mod: 26 | Performed by: INTERNAL MEDICINE

## 2019-11-20 PROCEDURE — 25000132 ZZH RX MED GY IP 250 OP 250 PS 637: Performed by: INTERNAL MEDICINE

## 2019-11-20 PROCEDURE — 25000128 H RX IP 250 OP 636: Performed by: INTERNAL MEDICINE

## 2019-11-20 RX ORDER — METOPROLOL TARTRATE 1 MG/ML
5-15 INJECTION, SOLUTION INTRAVENOUS
Status: DISCONTINUED | OUTPATIENT
Start: 2019-11-20 | End: 2019-11-21 | Stop reason: HOSPADM

## 2019-11-20 RX ORDER — ACYCLOVIR 200 MG/1
0-1 CAPSULE ORAL
Status: DISCONTINUED | OUTPATIENT
Start: 2019-11-20 | End: 2019-11-21 | Stop reason: HOSPADM

## 2019-11-20 RX ORDER — IOPAMIDOL 755 MG/ML
120 INJECTION, SOLUTION INTRAVASCULAR ONCE
Status: COMPLETED | OUTPATIENT
Start: 2019-11-20 | End: 2019-11-20

## 2019-11-20 RX ORDER — NITROGLYCERIN 0.4 MG/1
.4-.8 TABLET SUBLINGUAL
Status: DISCONTINUED | OUTPATIENT
Start: 2019-11-20 | End: 2019-11-21 | Stop reason: HOSPADM

## 2019-11-20 RX ORDER — METOPROLOL TARTRATE 50 MG
50-100 TABLET ORAL
Status: COMPLETED | OUTPATIENT
Start: 2019-11-20 | End: 2019-11-20

## 2019-11-20 RX ADMIN — METOPROLOL TARTRATE 25 MG: 25 TABLET ORAL at 08:15

## 2019-11-20 RX ADMIN — IOPAMIDOL 120 ML: 755 INJECTION, SOLUTION INTRAVENOUS at 08:59

## 2019-11-20 RX ADMIN — NITROGLYCERIN 0.8 MG: 0.4 TABLET SUBLINGUAL at 09:04

## 2019-11-20 NOTE — PROGRESS NOTES
Pt arrived for Coronary CT angiogram. Test, meds and side effects reviewed with pt. Resting HR  55-60 bpm; given 25 mg PO Metoprolol per verbal order. Administered 0.8 mg SL nitro on CTA table per order. CTA completed; tolerated procedure well and denies symptoms of allergic reaction. Post monitoring completed and VSS. D/C instructions reviewed with pt whom verbalized understanding of need to increase PO fluids today. D/C to gold waiting room accompanied by staff.

## 2019-11-25 ENCOUNTER — HOSPITAL ENCOUNTER (OUTPATIENT)
Facility: AMBULATORY SURGERY CENTER | Age: 59
End: 2019-11-25
Attending: INTERNAL MEDICINE
Payer: COMMERCIAL

## 2019-11-25 VITALS
RESPIRATION RATE: 16 BRPM | OXYGEN SATURATION: 98 % | BODY MASS INDEX: 25.84 KG/M2 | HEART RATE: 56 BPM | HEIGHT: 73 IN | SYSTOLIC BLOOD PRESSURE: 135 MMHG | TEMPERATURE: 98.5 F | DIASTOLIC BLOOD PRESSURE: 94 MMHG | WEIGHT: 195 LBS

## 2019-11-25 DIAGNOSIS — E78.5 HYPERLIPIDEMIA, UNSPECIFIED HYPERLIPIDEMIA TYPE: Primary | ICD-10-CM

## 2019-11-25 LAB — COLONOSCOPY: NORMAL

## 2019-11-25 RX ORDER — ONDANSETRON 2 MG/ML
4 INJECTION INTRAMUSCULAR; INTRAVENOUS EVERY 6 HOURS PRN
Status: DISCONTINUED | OUTPATIENT
Start: 2019-11-25 | End: 2019-11-26 | Stop reason: HOSPADM

## 2019-11-25 RX ORDER — SIMETHICONE
LIQUID (ML) MISCELLANEOUS PRN
Status: DISCONTINUED | OUTPATIENT
Start: 2019-11-25 | End: 2019-11-25 | Stop reason: HOSPADM

## 2019-11-25 RX ORDER — NALOXONE HYDROCHLORIDE 0.4 MG/ML
.1-.4 INJECTION, SOLUTION INTRAMUSCULAR; INTRAVENOUS; SUBCUTANEOUS
Status: DISCONTINUED | OUTPATIENT
Start: 2019-11-25 | End: 2019-11-26 | Stop reason: HOSPADM

## 2019-11-25 RX ORDER — FENTANYL CITRATE 50 UG/ML
INJECTION, SOLUTION INTRAMUSCULAR; INTRAVENOUS PRN
Status: DISCONTINUED | OUTPATIENT
Start: 2019-11-25 | End: 2019-11-25 | Stop reason: HOSPADM

## 2019-11-25 RX ORDER — ATORVASTATIN CALCIUM 10 MG/1
10 TABLET, FILM COATED ORAL DAILY
Qty: 100 TABLET | Refills: 3 | Status: SHIPPED | OUTPATIENT
Start: 2019-11-25 | End: 2020-11-10

## 2019-11-25 RX ORDER — FLUMAZENIL 0.1 MG/ML
0.2 INJECTION, SOLUTION INTRAVENOUS
Status: ACTIVE | OUTPATIENT
Start: 2019-11-25 | End: 2019-11-25

## 2019-11-25 RX ORDER — ONDANSETRON 4 MG/1
4 TABLET, ORALLY DISINTEGRATING ORAL EVERY 6 HOURS PRN
Status: DISCONTINUED | OUTPATIENT
Start: 2019-11-25 | End: 2019-11-26 | Stop reason: HOSPADM

## 2019-11-25 RX ORDER — ONDANSETRON 2 MG/ML
4 INJECTION INTRAMUSCULAR; INTRAVENOUS
Status: DISCONTINUED | OUTPATIENT
Start: 2019-11-25 | End: 2019-11-25 | Stop reason: HOSPADM

## 2019-11-25 RX ORDER — LIDOCAINE 40 MG/G
CREAM TOPICAL
Status: DISCONTINUED | OUTPATIENT
Start: 2019-11-25 | End: 2019-11-25 | Stop reason: HOSPADM

## 2019-11-25 ASSESSMENT — MIFFLIN-ST. JEOR: SCORE: 1743.39

## 2019-11-26 LAB — COPATH REPORT: NORMAL

## 2019-11-27 NOTE — RESULT ENCOUNTER NOTE
Pathology from colonoscopy reviewed. One adenoma removed. Recommend repeat colonoscopy in 5 years for surveillance.    Luis Gustafson MD  [facility]  Division of Gastroenterology and Hepatology  [facility] [address] [place], [place] [address]

## 2020-03-10 ENCOUNTER — HEALTH MAINTENANCE LETTER (OUTPATIENT)
Age: 60
End: 2020-03-10

## 2020-03-17 ENCOUNTER — TELEPHONE (OUTPATIENT)
Dept: INTERNAL MEDICINE | Facility: CLINIC | Age: 60
End: 2020-03-17

## 2020-03-17 NOTE — TELEPHONE ENCOUNTER
Pharmacy has sent a Prior Authorization request via Cover My Meds for Atorvastatin Calcium 10MG tablets. Do you want to pursue a Prior Auth?  Key: [id number]

## 2020-03-18 NOTE — TELEPHONE ENCOUNTER
Prior Authorization Retail Medication Request    Medication/Dose: Atorvastatin Calcium 10MG tablets  ICD code (if different than what is on RX):    Previously Tried and Failed:    Rationale:      Insurance Name:    Insurance ID:        Pharmacy Information (if different than what is on RX)  Name:    Phone:

## 2020-03-18 NOTE — TELEPHONE ENCOUNTER
Prior Authorization Not Needed per Insurance    Medication: Atorvastatin Calcium 10MG tablets-PA NOT NEEDED   Insurance Company: Michael ([facility]) - Phone [phone] Fax [phone]  Expected CoPay: $4.16    Pharmacy Filling the Rx: [id number] - [facility] [id number] [facility]  Pharmacy Notified: Yes  Patient Notified: No    Called pharmacy and pharmacy stated that PA is Not Needed and medication is covered. Pharmacy stated that they have a paid claim on medication quantity 30 for 30 day supply on 3/16/2020 and patient has picked up medication. Pharmacy stated that patient has been pay out of pocket for medication for quantity 100. Insurance stated that PA is Not Needed and medication is covered.

## 2020-11-05 DIAGNOSIS — E78.5 HYPERLIPIDEMIA, UNSPECIFIED HYPERLIPIDEMIA TYPE: ICD-10-CM

## 2020-11-10 ENCOUNTER — TELEPHONE (OUTPATIENT)
Dept: INTERNAL MEDICINE | Facility: CLINIC | Age: 60
End: 2020-11-10

## 2020-11-10 DIAGNOSIS — E78.5 HYPERLIPIDEMIA, UNSPECIFIED HYPERLIPIDEMIA TYPE: Primary | ICD-10-CM

## 2020-11-10 DIAGNOSIS — Z00.00 ROUTINE HISTORY AND PHYSICAL EXAMINATION OF ADULT: ICD-10-CM

## 2020-11-10 RX ORDER — ATORVASTATIN CALCIUM 10 MG/1
10 TABLET, FILM COATED ORAL DAILY
Qty: 30 TABLET | Refills: 0 | Status: SHIPPED | OUTPATIENT
Start: 2020-11-10 | End: 2021-01-11

## 2020-11-10 NOTE — TELEPHONE ENCOUNTER
Last Clinic Visit: 11/12/2019 [facility]  Future visit: 12/16/2020    atorvastatin: Lab(s)- LDL past due.  La refill was sent for 30 days and FYI to [facility] clinic.   *future visit 12/16/2020

## 2020-11-10 NOTE — TELEPHONE ENCOUNTER
I called and left , labs signed. He should call us to get lab appointment, recommend no earlier than 10 days prior than his actual appointment with Dr. Estevez.   Melodie Novak, EMT at 12:41 PM on 11/10/2020.

## 2020-11-10 NOTE — TELEPHONE ENCOUNTER
[facility] Call Center    Phone Message    May a detailed message be left on voicemail: yes     Reason for Call: Order(s): Other:   Reason for requested: Lab orders for physical  Date needed: before 12/16/2020  Provider name: Herb    Patient wants to get labs prior to his physical so they can be reviewed by Dr. Estevez. Please order these tests and contact patient once he can schedule.      Action Taken: Message routed to:  Other: Primary Care    Travel Screening: Not Applicable

## 2021-01-07 DIAGNOSIS — Z00.00 ROUTINE HISTORY AND PHYSICAL EXAMINATION OF ADULT: ICD-10-CM

## 2021-01-07 DIAGNOSIS — E78.5 HYPERLIPIDEMIA, UNSPECIFIED HYPERLIPIDEMIA TYPE: ICD-10-CM

## 2021-01-07 LAB
ANION GAP SERPL CALCULATED.3IONS-SCNC: 6 MMOL/L (ref 3–14)
BUN SERPL-MCNC: 14 MG/DL (ref 7–30)
CALCIUM SERPL-MCNC: 8.7 MG/DL (ref 8.5–10.1)
CHLORIDE SERPL-SCNC: 111 MMOL/L (ref 94–109)
CHOLEST SERPL-MCNC: 168 MG/DL
CO2 SERPL-SCNC: 23 MMOL/L (ref 20–32)
CREAT SERPL-MCNC: 0.88 MG/DL (ref 0.66–1.25)
ERYTHROCYTE [DISTWIDTH] IN BLOOD BY AUTOMATED COUNT: 12.2 % (ref 10–15)
GFR SERPL CREATININE-BSD FRML MDRD: >90 ML/MIN/{1.73_M2}
GLUCOSE SERPL-MCNC: 98 MG/DL (ref 70–99)
HCT VFR BLD AUTO: 43.6 % (ref 40–53)
HDLC SERPL-MCNC: 56 MG/DL
HGB BLD-MCNC: 15.1 G/DL (ref 13.3–17.7)
LDLC SERPL CALC-MCNC: 85 MG/DL
MCH RBC QN AUTO: 32.2 PG (ref 26.5–33)
MCHC RBC AUTO-ENTMCNC: 34.6 G/DL (ref 31.5–36.5)
MCV RBC AUTO: 93 FL (ref 78–100)
NONHDLC SERPL-MCNC: 112 MG/DL
PLATELET # BLD AUTO: 213 10E9/L (ref 150–450)
POTASSIUM SERPL-SCNC: 4.2 MMOL/L (ref 3.4–5.3)
RBC # BLD AUTO: 4.69 10E12/L (ref 4.4–5.9)
SODIUM SERPL-SCNC: 140 MMOL/L (ref 133–144)
TRIGL SERPL-MCNC: 136 MG/DL
WBC # BLD AUTO: 5.4 10E9/L (ref 4–11)

## 2021-01-07 PROCEDURE — 80061 LIPID PANEL: CPT | Performed by: INTERNAL MEDICINE

## 2021-01-07 PROCEDURE — 36415 COLL VENOUS BLD VENIPUNCTURE: CPT | Performed by: INTERNAL MEDICINE

## 2021-01-07 PROCEDURE — 85027 COMPLETE CBC AUTOMATED: CPT | Performed by: INTERNAL MEDICINE

## 2021-01-07 PROCEDURE — 80048 BASIC METABOLIC PNL TOTAL CA: CPT | Performed by: INTERNAL MEDICINE

## 2021-01-11 ENCOUNTER — OFFICE VISIT (OUTPATIENT)
Dept: INTERNAL MEDICINE | Facility: CLINIC | Age: 61
End: 2021-01-11
Payer: COMMERCIAL

## 2021-01-11 VITALS
SYSTOLIC BLOOD PRESSURE: 132 MMHG | DIASTOLIC BLOOD PRESSURE: 87 MMHG | WEIGHT: 203 LBS | HEART RATE: 81 BPM | BODY MASS INDEX: 26.78 KG/M2 | OXYGEN SATURATION: 97 %

## 2021-01-11 DIAGNOSIS — E78.5 HYPERLIPIDEMIA, UNSPECIFIED HYPERLIPIDEMIA TYPE: ICD-10-CM

## 2021-01-11 DIAGNOSIS — I25.10 CORONARY ARTERY DISEASE INVOLVING NATIVE CORONARY ARTERY OF NATIVE HEART WITHOUT ANGINA PECTORIS: Primary | ICD-10-CM

## 2021-01-11 PROCEDURE — 99396 PREV VISIT EST AGE 40-64: CPT | Performed by: INTERNAL MEDICINE

## 2021-01-11 RX ORDER — ATORVASTATIN CALCIUM 10 MG/1
10 TABLET, FILM COATED ORAL DAILY
Qty: 90 TABLET | Refills: 3 | Status: SHIPPED | OUTPATIENT
Start: 2021-01-11 | End: 2022-01-05

## 2021-01-11 ASSESSMENT — PAIN SCALES - GENERAL: PAINLEVEL: NO PAIN (0)

## 2021-01-11 NOTE — NURSING NOTE
Chief Complaint   Patient presents with     Physical     Pt. here for physical       TOBY Jordan at 2:59 PM on 1/11/2021.

## 2021-01-11 NOTE — PROGRESS NOTES
HPI  60-year-old  presents today for physical examination.  He is now on the atorvastatin 10 mg daily is tolerating this well.  He has put on some weight during the pandemic.  He has had to give up running due to plantar fasciitis.  He is restricted to walking and riding his bike .  He is trying to exercise regularly however.  He has been doing some stair stretches for the plantar fascia but has not been real aggressive with this.  He has tried some insoles but no heel cups.  Otherwise he is asymptomatic diet is reasonable he monitors his blood pressure at home and feels it is running somewhat similar to what we are seeing today.  Past Medical History:   Diagnosis Date     Hyperlipidemia      Hypertension      Past Surgical History:   Procedure Laterality Date     COLONOSCOPY N/A 2019    Procedure: COLONOSCOPY, WITH POLYPECTOMY;  Surgeon: Luis Gustafson MD;  Location: [facility] OR     NO HISTORY OF SURGERY       Family History   Problem Relation Age of Onset     Parkinsonism Mother      Cancer Mother         thyroid     Heart Failure Father      Sleep Apnea Brother      Obesity Brother      Diabetes Brother      Parkinsonism Sister      Diabetes Brother      Glaucoma No family hx of      Macular Degeneration No family hx of      Eye Surgery No family hx of      Retinal detachment No family hx of      Social History     Socioeconomic History     Marital status:      Spouse name: Not on file     Number of children: Not on file     Years of education: Not on file     Highest education level: Not on file   Occupational History     Not on file   Social Needs     Financial resource strain: Not on file     Food insecurity     Worry: Not on file     Inability: Not on file     Transportation needs     Medical: Not on file     Non-medical: Not on file   Tobacco Use     Smoking status: Former Smoker     Types: Cigarettes     Quit date: 1984     Years since quittin.4     Smokeless tobacco: Never  Used   Substance and Sexual Activity     Alcohol use: Yes     Comment: 1 drink a few times per week, 2 on weekends     Drug use: Not Currently     Sexual activity: Not on file   Lifestyle     Physical activity     Days per week: Not on file     Minutes per session: Not on file     Stress: Not on file   Relationships     Social connections     Talks on phone: Not on file     Gets together: Not on file     Attends Taoism service: Not on file     Active member of club or organization: Not on file     Attends meetings of clubs or organizations: Not on file     Relationship status: Not on file     Intimate partner violence     Fear of current or ex partner: Not on file     Emotionally abused: Not on file     Physically abused: Not on file     Forced sexual activity: Not on file   Other Topics Concern     Parent/sibling w/ CABG, MI or angioplasty before 65F 55M? Not Asked   Social History Narrative     Not on file     Answers for HPI/ROS submitted by the patient on 1/6/2021   General Symptoms: No  Skin Symptoms: No  HENT Symptoms: No  EYE SYMPTOMS: No  HEART SYMPTOMS: No  LUNG SYMPTOMS: No  INTESTINAL SYMPTOMS: No  URINARY SYMPTOMS: No  REPRODUCTIVE SYMPTOMS: No  SKELETAL SYMPTOMS: No  BLOOD SYMPTOMS: No  NERVOUS SYSTEM SYMPTOMS: No  MENTAL HEALTH SYMPTOMS: No    Exam:  /87 (BP Location: Right arm, Patient Position: Sitting, Cuff Size: Adult Large)   Pulse 81   Wt 92.1 kg (203 lb)   SpO2 97%   BMI 26.78 kg/m    203 lbs 0 oz  Physical Exam   The patient is alert, oriented with a clear sensorium.   Skin shows no lesions or rashes and good turgor.   Head is normocephalic and atraumatic.   Eyes show PERRLA    Ears show cerumen bilaterally.   Neck shows no nodes, thyromegaly or bruits.   Back is non tender.  Lungs are clear to percussion and auscultation.   Heart shows normal S1 and S2 without murmur or gallop.   Abdomen is soft and nontender without masses or organomegaly.   Genitalia show normal testes. No  evidence of inguinal hernia.  Rectal shows large smooth prostate without nodules or masses.  Extremities show no edema and no evidence of active synovitis.   Neurologic examination shows cranial nerves II-XII intact. Motor shows 5/5 strength. Reflexes are symmetric. Cerebellar testing shows normal tandem gait.  Romberg negative.    Labs reviewed with him:  Results for FRANTZ HURST (MRN [id number]) as of 1/11/2021 15:16   Ref. Range 1/7/2021 08:29   Sodium Latest Ref Range: 133 - 144 mmol/L 140   Potassium Latest Ref Range: 3.4 - 5.3 mmol/L 4.2   Chloride Latest Ref Range: 94 - 109 mmol/L 111 (H)   Carbon Dioxide Latest Ref Range: 20 - 32 mmol/L 23   Urea Nitrogen Latest Ref Range: 7 - 30 mg/dL 14   Creatinine Latest Ref Range: 0.66 - 1.25 mg/dL 0.88   GFR Estimate Latest Ref Range: >60 mL/min/1.73_m2 >90   GFR Estimate If Black Latest Ref Range: >60 mL/min/1.73_m2 >90   Calcium Latest Ref Range: 8.5 - 10.1 mg/dL 8.7   Anion Gap Latest Ref Range: 3 - 14 mmol/L 6   Cholesterol Latest Ref Range: <200 mg/dL 168   HDL Cholesterol Latest Ref Range: >39 mg/dL 56   LDL Cholesterol Calculated Latest Ref Range: <100 mg/dL 85   Non HDL Cholesterol Latest Ref Range: <130 mg/dL 112   Triglycerides Latest Ref Range: <150 mg/dL 136   Glucose Latest Ref Range: 70 - 99 mg/dL 98   WBC Latest Ref Range: 4.0 - 11.0 10e9/L 5.4   Hemoglobin Latest Ref Range: 13.3 - 17.7 g/dL 15.1   Hematocrit Latest Ref Range: 40.0 - 53.0 % 43.6   Platelet Count Latest Ref Range: 150 - 450 10e9/L 213   RBC Count Latest Ref Range: 4.4 - 5.9 10e12/L 4.69   MCV Latest Ref Range: 78 - 100 fl 93   MCH Latest Ref Range: 26.5 - 33.0 pg 32.2   MCHC Latest Ref Range: 31.5 - 36.5 g/dL 34.6   RDW Latest Ref Range: 10.0 - 15.0 % 12.2     ASSESSMENT  1 hypertension  2 hyperlipidemia on atorvastatin  3 Mild 3 vessel CAD on CTA  4 BPH  5 Plantar fassciits    Plan  We discussed nonpharmacologic blood pressure control measures and closely monitoring and averaging  his blood pressure at home.  We discussed a desire to keep it less than 130/80 but certainly if it is running 140/90 he will need pharmacotherapy for this.  We will continue him on the atorvastatin I reviewed stretches for his plantar fascia.  He is asymptomatic from a coronary artery disease standpoint.  This note was completed using Dragon voice recognition software.      Gus Estevez MD  General Internal Medicine  [facility]  [phone]

## 2021-04-14 ENCOUNTER — OFFICE VISIT (OUTPATIENT)
Dept: NURSING | Facility: CLINIC | Age: 61
End: 2021-04-14
Payer: COMMERCIAL

## 2021-04-14 PROCEDURE — 91300 PR COVID VAC PFIZER DIL RECON 30 MCG/0.3 ML IM: CPT

## 2021-04-14 PROCEDURE — 0001A PR COVID VAC PFIZER DIL RECON 30 MCG/0.3 ML IM: CPT

## 2021-05-05 ENCOUNTER — IMMUNIZATION (OUTPATIENT)
Dept: NURSING | Facility: CLINIC | Age: 61
End: 2021-05-05
Attending: INTERNAL MEDICINE
Payer: COMMERCIAL

## 2021-05-05 PROCEDURE — 0002A PR COVID VAC PFIZER DIL RECON 30 MCG/0.3 ML IM: CPT

## 2021-05-05 PROCEDURE — 91300 PR COVID VAC PFIZER DIL RECON 30 MCG/0.3 ML IM: CPT

## 2021-10-03 ENCOUNTER — HEALTH MAINTENANCE LETTER (OUTPATIENT)
Age: 61
End: 2021-10-03

## 2021-11-09 ENCOUNTER — MYC MEDICAL ADVICE (OUTPATIENT)
Dept: INTERNAL MEDICINE | Facility: CLINIC | Age: 61
End: 2021-11-09
Payer: COMMERCIAL

## 2022-01-03 DIAGNOSIS — E78.5 HYPERLIPIDEMIA, UNSPECIFIED HYPERLIPIDEMIA TYPE: ICD-10-CM

## 2022-01-05 RX ORDER — ATORVASTATIN CALCIUM 10 MG/1
10 TABLET, FILM COATED ORAL DAILY
Qty: 30 TABLET | Refills: 0 | Status: SHIPPED | OUTPATIENT
Start: 2022-01-05 | End: 2022-01-18

## 2022-01-05 NOTE — TELEPHONE ENCOUNTER
Last Clinic Visit: 2021  [facility] Internal [facility] [place]  Future Visit 2022    FYI to [facility] clinic: Lab(s)- LDL will be due 2022.  Future visit 2022.  Lab order in chart but listed to  2022    LDL Cholesterol Calculated   Date Value Ref Range Status   2021 85 <100 mg/dL Final     Comment:     Desirable:       <100 mg/dl

## 2022-01-12 ENCOUNTER — LAB (OUTPATIENT)
Dept: LAB | Facility: CLINIC | Age: 62
End: 2022-01-12
Payer: COMMERCIAL

## 2022-01-12 DIAGNOSIS — E78.5 HYPERLIPIDEMIA, UNSPECIFIED HYPERLIPIDEMIA TYPE: ICD-10-CM

## 2022-01-12 DIAGNOSIS — I25.10 CORONARY ARTERY DISEASE INVOLVING NATIVE CORONARY ARTERY OF NATIVE HEART WITHOUT ANGINA PECTORIS: ICD-10-CM

## 2022-01-12 LAB
ALBUMIN SERPL-MCNC: 3.8 G/DL (ref 3.4–5)
ALP SERPL-CCNC: 68 U/L (ref 40–150)
ALT SERPL W P-5'-P-CCNC: 36 U/L (ref 0–70)
ANION GAP SERPL CALCULATED.3IONS-SCNC: 5 MMOL/L (ref 3–14)
AST SERPL W P-5'-P-CCNC: 27 U/L (ref 0–45)
BASOPHILS # BLD AUTO: 0 10E3/UL (ref 0–0.2)
BASOPHILS NFR BLD AUTO: 0 %
BILIRUB SERPL-MCNC: 0.5 MG/DL (ref 0.2–1.3)
BUN SERPL-MCNC: 11 MG/DL (ref 7–30)
CALCIUM SERPL-MCNC: 9.2 MG/DL (ref 8.5–10.1)
CHLORIDE BLD-SCNC: 108 MMOL/L (ref 94–109)
CHOLEST SERPL-MCNC: 187 MG/DL
CO2 SERPL-SCNC: 27 MMOL/L (ref 20–32)
CREAT SERPL-MCNC: 1 MG/DL (ref 0.66–1.25)
EOSINOPHIL # BLD AUTO: 0.1 10E3/UL (ref 0–0.7)
EOSINOPHIL NFR BLD AUTO: 3 %
ERYTHROCYTE [DISTWIDTH] IN BLOOD BY AUTOMATED COUNT: 12.5 % (ref 10–15)
FASTING STATUS PATIENT QL REPORTED: YES
GFR SERPL CREATININE-BSD FRML MDRD: 85 ML/MIN/1.73M2
GLUCOSE BLD-MCNC: 94 MG/DL (ref 70–99)
HCT VFR BLD AUTO: 42.5 % (ref 40–53)
HDLC SERPL-MCNC: 52 MG/DL
HGB BLD-MCNC: 15.3 G/DL (ref 13.3–17.7)
LDLC SERPL CALC-MCNC: 111 MG/DL
LYMPHOCYTES # BLD AUTO: 1.4 10E3/UL (ref 0.8–5.3)
LYMPHOCYTES NFR BLD AUTO: 26 %
MCH RBC QN AUTO: 32.3 PG (ref 26.5–33)
MCHC RBC AUTO-ENTMCNC: 36 G/DL (ref 31.5–36.5)
MCV RBC AUTO: 90 FL (ref 78–100)
MONOCYTES # BLD AUTO: 0.6 10E3/UL (ref 0–1.3)
MONOCYTES NFR BLD AUTO: 10 %
NEUTROPHILS # BLD AUTO: 3.4 10E3/UL (ref 1.6–8.3)
NEUTROPHILS NFR BLD AUTO: 62 %
NONHDLC SERPL-MCNC: 135 MG/DL
PLATELET # BLD AUTO: 229 10E3/UL (ref 150–450)
POTASSIUM BLD-SCNC: 4 MMOL/L (ref 3.4–5.3)
PROT SERPL-MCNC: 6.9 G/DL (ref 6.8–8.8)
RBC # BLD AUTO: 4.73 10E6/UL (ref 4.4–5.9)
SODIUM SERPL-SCNC: 140 MMOL/L (ref 133–144)
TRIGL SERPL-MCNC: 119 MG/DL
WBC # BLD AUTO: 5.6 10E3/UL (ref 4–11)

## 2022-01-12 PROCEDURE — 80053 COMPREHEN METABOLIC PANEL: CPT

## 2022-01-12 PROCEDURE — 80061 LIPID PANEL: CPT

## 2022-01-12 PROCEDURE — 36415 COLL VENOUS BLD VENIPUNCTURE: CPT

## 2022-01-12 PROCEDURE — 85025 COMPLETE CBC W/AUTO DIFF WBC: CPT

## 2022-01-13 ASSESSMENT — ENCOUNTER SYMPTOMS
JAUNDICE: 0
VOMITING: 0
BLOOD IN STOOL: 0
HEARTBURN: 0
BOWEL INCONTINENCE: 0
ABDOMINAL PAIN: 1
DIARRHEA: 0
RECTAL PAIN: 0
NAUSEA: 0
BLOATING: 0

## 2022-01-18 ENCOUNTER — OFFICE VISIT (OUTPATIENT)
Dept: INTERNAL MEDICINE | Facility: CLINIC | Age: 62
End: 2022-01-18
Payer: COMMERCIAL

## 2022-01-18 VITALS
DIASTOLIC BLOOD PRESSURE: 82 MMHG | RESPIRATION RATE: 16 BRPM | HEART RATE: 69 BPM | OXYGEN SATURATION: 97 % | BODY MASS INDEX: 27.61 KG/M2 | SYSTOLIC BLOOD PRESSURE: 135 MMHG | HEIGHT: 73 IN | WEIGHT: 208.3 LBS

## 2022-01-18 DIAGNOSIS — K29.70 GASTRITIS WITHOUT BLEEDING, UNSPECIFIED CHRONICITY, UNSPECIFIED GASTRITIS TYPE: ICD-10-CM

## 2022-01-18 DIAGNOSIS — M72.2 PLANTAR FASCIITIS: Primary | ICD-10-CM

## 2022-01-18 DIAGNOSIS — E78.5 HYPERLIPIDEMIA, UNSPECIFIED HYPERLIPIDEMIA TYPE: ICD-10-CM

## 2022-01-18 DIAGNOSIS — L57.0 AK (ACTINIC KERATOSIS): ICD-10-CM

## 2022-01-18 PROCEDURE — 17000 DESTRUCT PREMALG LESION: CPT | Performed by: INTERNAL MEDICINE

## 2022-01-18 PROCEDURE — 99396 PREV VISIT EST AGE 40-64: CPT | Mod: 25 | Performed by: INTERNAL MEDICINE

## 2022-01-18 RX ORDER — ATORVASTATIN CALCIUM 10 MG/1
10 TABLET, FILM COATED ORAL DAILY
Qty: 100 TABLET | Refills: 3 | Status: SHIPPED | OUTPATIENT
Start: 2022-01-18 | End: 2023-01-03

## 2022-01-18 RX ORDER — OMEPRAZOLE 40 MG/1
40 CAPSULE, DELAYED RELEASE ORAL DAILY
COMMUNITY
Start: 2022-01-18 | End: 2023-01-03

## 2022-01-18 ASSESSMENT — MIFFLIN-ST. JEOR: SCORE: 1793.72

## 2022-01-18 NOTE — NURSING NOTE
"Chris Kirkland is a 61 year old male patient that presents today in clinic for the following:    Chief Complaint   Patient presents with     Physical     Annual Visit     The patient's allergies and medications were reviewed as noted. A set of vitals were recorded as noted without incident: /82 (BP Location: Right arm, Patient Position: Sitting, Cuff Size: Adult Regular)   Pulse 69   Resp 16   Ht 1.854 m (6' 1\")   Wt 94.5 kg (208 lb 4.8 oz)   SpO2 97%   BMI 27.48 kg/m  . The patient was asked if they had any of the following symptoms in the last forty-eight hours: (1) fever or chills, (2) cough, (3) shortness of breath or difficulty breathing, (4) fatigue, (5) muscle or body aches, (6) headache, (7) new loss of taste or smell, (8) sore throat, (9) congestion or runny nose, (10) nausea or vomiting, and (11) diarrhea. Chris Kirkland denies having any of the following symptoms in the last forty-eight hours: (1) fever or chills, (2) cough, (3) shortness of breath or difficulty breathing, (4) fatigue, (5) muscle or body aches, (6) headache, (7) new loss of taste or smell, (8) sore throat, (9) congestion or runny nose, (10) nausea or vomiting, and (11) diarrhea. The patient does not have any other questions for the provider.    Dallin Finney, EMT at 10:56 AM on 1/18/2022  "

## 2022-01-18 NOTE — PROGRESS NOTES
Podiatry  HPI  61-year-old  presents today for physical examination.  He has been doing well this past year.  He has been taking the atorvastatin regularly says by his report.  He has had no side effects or ill effects from this he is not aware of any significant change in his diet at all.  We reviewed his lipids and it shows a significant increase in his LDL cholesterol.  This can intensify with diet.  He has been monitoring his blood pressure at home and this has been running in the 1 40-1 50 range systolic.  Diastolics are little better more in the 70-80 range.  He has not been on any antihypertensives.  He is exercising regularly but he is using a fair amount of salt in the diet and there is room to increase his fruit and vegetable consumption.  We discussed the nonpharmacologic blood pressure control measures.  He was treated for gastritis with omeprazole recently after presenting with abdominal pain and that is improved on the omeprazole.  Continues to have problems with the plantar fascia right greater than left this precludes running and it limits a lot of his weightbearing and physical activity by his report  Past Medical History:   Diagnosis Date     Hyperlipidemia      Hypertension      Past Surgical History:   Procedure Laterality Date     COLONOSCOPY N/A 11/25/2019    Procedure: COLONOSCOPY, WITH POLYPECTOMY;  Surgeon: Luis Gustafson MD;  Location: [facility]     NO HISTORY OF SURGERY       Family History   Problem Relation Age of Onset     Parkinsonism Mother      Cancer Mother         thyroid     Heart Failure Father      Sleep Apnea Brother      Obesity Brother      Diabetes Brother      Parkinsonism Sister      Diabetes Brother      Glaucoma No family hx of      Macular Degeneration No family hx of      Eye Surgery No family hx of      Retinal detachment No family hx of      Answers for HPI/ROS submitted by the patient on 1/13/2022  General Symptoms: No  Skin Symptoms: No  HENT Symptoms: No  EYE  "SYMPTOMS: No  HEART SYMPTOMS: No  LUNG SYMPTOMS: No  INTESTINAL SYMPTOMS: Yes  URINARY SYMPTOMS: No  REPRODUCTIVE SYMPTOMS: No  SKELETAL SYMPTOMS: No  BLOOD SYMPTOMS: No  NERVOUS SYSTEM SYMPTOMS: No  MENTAL HEALTH SYMPTOMS: No  Heart burn or indigestion: No  Nausea: No  Vomiting: No  Abdominal pain: Yes  Bloating: No  Diarrhea: No  Blood in stool: No  Black stools: No  Rectal or Anal pain: No  Fecal incontinence: No  Yellowing of skin or eyes: No  Vomit with blood: No  Change in stools: No        Exam:  /82 (BP Location: Right arm, Patient Position: Sitting, Cuff Size: Adult Regular)   Pulse 69   Resp 16   Ht 1.854 m (6' 1\")   Wt 94.5 kg (208 lb 4.8 oz)   SpO2 97%   BMI 27.48 kg/m    208 lbs 4.8 oz  Physical Exam   The patient is alert, oriented with a clear sensorium.   Skin shows 1 cm AK upper mid chest.  This was treated with liquid nitrogen.  He has several minimally to nonpigmented seborrheic keratosis 1 on the forehead couple on the right shoulder  Head is normocephalic and atraumatic.   Eyes show PERRLA with benign optic fundi.   Ears show cerumen bilaterally.   Mouth shows clear oral mucosa.   Neck shows no nodes, thyromegaly or bruits.   Back is non tender.  Lungs are clear to percussion and auscultation.   Heart shows normal S1 and S2 without murmur or gallop.   Abdomen is soft and nontender without masses or organomegaly.   Genitalia show normal testes. No evidence of inguinal hernia.  Rectal shows large smooth prostate without nodules or masses.  Extremities show no edema and no evidence of active synovitis.  Pedal pulses are intact tight heel cords bilaterally tenderness at the insertion of the plantar fascia on the right  Neurologic examination shows cranial nerves II-XII intact. Motor shows 5/5 strength. Reflexes are symmetric. Cerebellar testing shows normal tandem gait.  Romberg negative.    Labs reviewed:  Results for FRANTZ HURST (MRN [id number]) as of 1/18/2022 10:54   Ref. " Range 1/12/2022 08:46   Sodium Latest Ref Range: 133 - 144 mmol/L 140   Potassium Latest Ref Range: 3.4 - 5.3 mmol/L 4.0   Chloride Latest Ref Range: 94 - 109 mmol/L 108   Carbon Dioxide Latest Ref Range: 20 - 32 mmol/L 27   Urea Nitrogen Latest Ref Range: 7 - 30 mg/dL 11   Creatinine Latest Ref Range: 0.66 - 1.25 mg/dL 1.00   GFR Estimate Latest Ref Range: >60 mL/min/1.73m2 85   Calcium Latest Ref Range: 8.5 - 10.1 mg/dL 9.2   Anion Gap Latest Ref Range: 3 - 14 mmol/L 5   Albumin Latest Ref Range: 3.4 - 5.0 g/dL 3.8   Protein Total Latest Ref Range: 6.8 - 8.8 g/dL 6.9   Bilirubin Total Latest Ref Range: 0.2 - 1.3 mg/dL 0.5   Alkaline Phosphatase Latest Ref Range: 40 - 150 U/L 68   ALT Latest Ref Range: 0 - 70 U/L 36   AST Latest Ref Range: 0 - 45 U/L 27   Cholesterol Latest Ref Range: <200 mg/dL 187   Patient Fasting > 8hrs? Unknown Yes   HDL Cholesterol Latest Ref Range: >=40 mg/dL 52   LDL Cholesterol Calculated Latest Ref Range: <=100 mg/dL 111 (H)   Non HDL Cholesterol Latest Ref Range: <130 mg/dL 135 (H)   Triglycerides Latest Ref Range: <150 mg/dL 119   Glucose Latest Ref Range: 70 - 99 mg/dL 94   WBC Latest Ref Range: 4.0 - 11.0 10e3/uL 5.6   Hemoglobin Latest Ref Range: 13.3 - 17.7 g/dL 15.3   Hematocrit Latest Ref Range: 40.0 - 53.0 % 42.5   Platelet Count Latest Ref Range: 150 - 450 10e3/uL 229   RBC Count Latest Ref Range: 4.40 - 5.90 10e6/uL 4.73   MCV Latest Ref Range: 78 - 100 fL 90   MCH Latest Ref Range: 26.5 - 33.0 pg 32.3   MCHC Latest Ref Range: 31.5 - 36.5 g/dL 36.0   RDW Latest Ref Range: 10.0 - 15.0 % 12.5   % Neutrophils Latest Units: % 62   % Lymphocytes Latest Units: % 26   % Monocytes Latest Units: % 10   % Eosinophils Latest Units: % 3   % Basophils Latest Units: % 0   Absolute Basophils Latest Ref Range: 0.0 - 0.2 10e3/uL 0.0   Absolute Eosinophils Latest Ref Range: 0.0 - 0.7 10e3/uL 0.1   Absolute Lymphocytes Latest Ref Range: 0.8 - 5.3 10e3/uL 1.4   Absolute Monocytes Latest Ref Range:  0.0 - 1.3 10e3/uL 0.6   Absolute Neutrophils Latest Ref Range: 1.6 - 8.3 10e3/uL 3.4     ASSESSMENT  1 hypertension needs F/U  2 hyperlipidemia on atorvastatin  3 Mild 3 vessel CAD on CTA  4 BPH  5 Plantar fassciits  6 Gastritis on omeprazole  7 AK mid chest treated with liquid nitrogen    Plan  We discussed nonpharmacologic blood pressure control measures using to follow these continue to monitor the blood pressure at home.  Schedule follow-up with fasting lipids in another couple of months with renewed attention to diet and exercise.  If LDL remains over 100 we will increase the atorvastatin.  Can see podiatry for his ongoing problems with the Planter fasciitis for possible injection.  Refilled his atorvastatin I will have him finish a 6 to 8-week course of the omeprazole  This note was completed using Dragon voice recognition software.      Gus Estevez MD  General Internal Medicine  [facility]  [phone]

## 2022-03-10 NOTE — PROGRESS NOTES
Assessment:      ICD-10-CM    1. Plantar fasciitis, right  M72.2 dexamethasone (DECADRON) injection 4 mg     triamcinolone (KENALOG-40) injection 40 mg     Bupivacaine 0.5 % Injection     INJECTION SINGLE TENDON SHEATH/LIGAMENT        Plan:  Orders Placed This Encounter   Procedures     INJECTION SINGLE TENDON SHEATH/LIGAMENT         Discussed the etiology and treatment of the condition with the patient.  Imaging studies reviewed and discussed with the patient.  Discussed surgical and conservative options.    -Injection-  Procedure:  injection  PARQ session held, verbal consent obtained.  Sterile skin prep.    Location:  Right plantar fascia  Contents:  3ml total, marcaine, kenalog-40, dexamethasone phosphate.  Dressing applied.    Post-injection instructions reviewed including close attention to amount and duration of pain relief.    -NSAID- topical.  No oral - h/o gastritis  -Orthoses- SuperFeet.   -Activity- low impact,calf muscle stretching.  -PT- discussed    Return:  No follow-ups on file.    Janell Barnhart DPM                Chief Complaint:        right heel pain    HPI:  Chris Kirkland is a 61 year old year old male who presents for evaluation of heel pain.    Patient Chris Calixto presents to the clinic with C/O bi-lateral 7/10 pain at its worst foot pain. Patient states the pain is concentrated to the right heel and is tollerable when he is not running or walking. Patient states that the paint worsens on impact, but gets better when he soaks it in ice cold water or snow. This pain is described as constant and aching. The patient is not diabetic. Chris was reffered by Dr. Estevez.       Past Medical & Surgical History:  Past Medical History:   Diagnosis Date     Hyperlipidemia      Hypertension       Past Surgical History:   Procedure Laterality Date     COLONOSCOPY N/A 11/25/2019    Procedure: COLONOSCOPY, WITH POLYPECTOMY;  Surgeon: Luis Gustafson MD;  Location: [facility]     NO HISTORY OF  "SURGERY        Family History   Problem Relation Age of Onset     Parkinsonism Mother      Cancer Mother         thyroid     Heart Failure Father      Sleep Apnea Brother      Obesity Brother      Diabetes Brother      Parkinsonism Sister      Diabetes Brother      Glaucoma No family hx of      Macular Degeneration No family hx of      Eye Surgery No family hx of      Retinal detachment No family hx of         Social History:  ?  History   Smoking Status     Former Smoker     Types: Cigarettes     Quit date: 8/19/1984   Smokeless Tobacco     Never Used     History   Drug Use Unknown     Social History    Substance and Sexual Activity      Alcohol use: Yes        Comment: 1 drink a few times per week, 2 on weekends      Allergies:  ?   Allergies   Allergen Reactions     Penicillins         Medications:    Current Outpatient Medications   Medication     atorvastatin (LIPITOR) 10 MG tablet     calcium carbonate (OS-ALPHONSE) 500 MG tablet     Vitamin D3 (CHOLECALCIFEROL) 25 mcg (1000 units) tablet     omeprazole (PRILOSEC) 40 MG DR capsule     Current Facility-Administered Medications   Medication     Bupivacaine 0.5 % Injection     dexamethasone (DECADRON) injection 4 mg     triamcinolone (KENALOG-40) injection 40 mg       Physical Exam:  ?  Vitals:  /78   Ht 1.854 m (6' 1\")   Wt 91.1 kg (200 lb 12.8 oz)   BMI 26.49 kg/m     General:  WD/WN, in NAD.  A&O x3.  Dermatologic:    Skin is intact, open lesions absent.   Skin texture, turgor is normal.  Vascular:  Pulses palpable bilateral.  Digital capillary refill time normal bilateral.  Skin temperature is normal to affected heel.  Generalized edema- none bilateral.  Focal edema- none heel right.  Neurologic:    Gross sensation normal.  Gait and balance abnormal, antalgic, R.  Musculoskeletal:  Maximal pain to palpation of plantar heel R > L.  mild pain to palpation of posterior heel at Achilles insertionbilateral.  Ankle dorsiflexion <10 degrees with the knee " extended, =10 degrees with the knee flexed.  STJ, MTJ ROM intact to affected extremity.  Muscle strength 5/5  foot and ankle to affected extremity.     Stance:  Foot type:  Valgus, L > R

## 2022-03-11 ENCOUNTER — OFFICE VISIT (OUTPATIENT)
Dept: PODIATRY | Facility: CLINIC | Age: 62
End: 2022-03-11
Payer: COMMERCIAL

## 2022-03-11 VITALS
WEIGHT: 200.8 LBS | BODY MASS INDEX: 26.61 KG/M2 | DIASTOLIC BLOOD PRESSURE: 78 MMHG | HEIGHT: 73 IN | SYSTOLIC BLOOD PRESSURE: 132 MMHG

## 2022-03-11 DIAGNOSIS — M72.2 PLANTAR FASCIITIS, RIGHT: Primary | ICD-10-CM

## 2022-03-11 PROCEDURE — 20550 NJX 1 TENDON SHEATH/LIGAMENT: CPT | Mod: RT | Performed by: PODIATRIST

## 2022-03-11 PROCEDURE — 99203 OFFICE O/P NEW LOW 30 MIN: CPT | Mod: 25 | Performed by: PODIATRIST

## 2022-03-11 RX ORDER — VITAMIN B COMPLEX
TABLET ORAL DAILY
COMMUNITY

## 2022-03-11 RX ORDER — BUPIVACAINE HYDROCHLORIDE 5 MG/ML
1 INJECTION, SOLUTION PERINEURAL ONCE
Status: ACTIVE | OUTPATIENT
Start: 2022-03-11

## 2022-03-11 RX ORDER — CALCIUM CARBONATE 500(1250)
1 TABLET ORAL 2 TIMES DAILY
COMMUNITY
End: 2023-01-03

## 2022-03-11 RX ORDER — TRIAMCINOLONE ACETONIDE 40 MG/ML
40 INJECTION, SUSPENSION INTRA-ARTICULAR; INTRAMUSCULAR ONCE
Status: COMPLETED | OUTPATIENT
Start: 2022-03-11 | End: 2022-03-11

## 2022-03-11 RX ORDER — DEXAMETHASONE SODIUM PHOSPHATE 4 MG/ML
4 INJECTION, SOLUTION INTRA-ARTICULAR; INTRALESIONAL; INTRAMUSCULAR; INTRAVENOUS; SOFT TISSUE ONCE
Status: COMPLETED | OUTPATIENT
Start: 2022-03-11 | End: 2022-03-11

## 2022-03-11 RX ADMIN — TRIAMCINOLONE ACETONIDE 40 MG: 40 INJECTION, SUSPENSION INTRA-ARTICULAR; INTRAMUSCULAR at 09:17

## 2022-03-11 RX ADMIN — DEXAMETHASONE SODIUM PHOSPHATE 4 MG: 4 INJECTION, SOLUTION INTRA-ARTICULAR; INTRALESIONAL; INTRAMUSCULAR; INTRAVENOUS; SOFT TISSUE at 09:16

## 2022-03-11 NOTE — LETTER
3/11/2022         RE: Chris Kirkland  [id number]  [facility] [address]        Dear Colleague,    Thank you for referring your patient, Chris Kirkland, to the [facility]. Please see a copy of my visit note below.    Assessment:      ICD-10-CM    1. Plantar fasciitis, right  M72.2 dexamethasone (DECADRON) injection 4 mg     triamcinolone (KENALOG-40) injection 40 mg     Bupivacaine 0.5 % Injection     INJECTION SINGLE TENDON SHEATH/LIGAMENT        Plan:  Orders Placed This Encounter   Procedures     INJECTION SINGLE TENDON SHEATH/LIGAMENT         Discussed the etiology and treatment of the condition with the patient.  Imaging studies reviewed and discussed with the patient.  Discussed surgical and conservative options.    -Injection-  Procedure:  injection  PARQ session held, verbal consent obtained.  Sterile skin prep.    Location:  Right plantar fascia  Contents:  3ml total, marcaine, kenalog-40, dexamethasone phosphate.  Dressing applied.    Post-injection instructions reviewed including close attention to amount and duration of pain relief.    -NSAID- topical.  No oral - h/o gastritis  -Orthoses- SuperFeet.   -Activity- low impact,calf muscle stretching.  -PT- discussed    Return:  No follow-ups on file.    Janell Barnhart DPM                Chief Complaint:        right heel pain    HPI:  Chris Kirkland is a 61 year old year old male who presents for evaluation of heel pain.    Patient Chris Calixto presents to the clinic with C/O bi-lateral 7/10 pain at its worst foot pain. Patient states the pain is concentrated to the right heel and is tollerable when he is not running or walking. Patient states that the paint worsens on impact, but gets better when he soaks it in ice cold water or snow. This pain is described as constant and aching. The patient is not diabetic. Chris was reffered by Dr. Estevez.       Past Medical & Surgical History:  Past Medical History:  "  Diagnosis Date     Hyperlipidemia      Hypertension       Past Surgical History:   Procedure Laterality Date     COLONOSCOPY N/A 11/25/2019    Procedure: COLONOSCOPY, WITH POLYPECTOMY;  Surgeon: Luis Gustafson MD;  Location: [facility]     NO HISTORY OF SURGERY        Family History   Problem Relation Age of Onset     Parkinsonism Mother      Cancer Mother         thyroid     Heart Failure Father      Sleep Apnea Brother      Obesity Brother      Diabetes Brother      Parkinsonism Sister      Diabetes Brother      Glaucoma No family hx of      Macular Degeneration No family hx of      Eye Surgery No family hx of      Retinal detachment No family hx of         Social History:  ?  History   Smoking Status     Former Smoker     Types: Cigarettes     Quit date: 8/19/1984   Smokeless Tobacco     Never Used     History   Drug Use Unknown     Social History    Substance and Sexual Activity      Alcohol use: Yes        Comment: 1 drink a few times per week, 2 on weekends      Allergies:  ?   Allergies   Allergen Reactions     Penicillins         Medications:    Current Outpatient Medications   Medication     atorvastatin (LIPITOR) 10 MG tablet     calcium carbonate (OS-ALPHONSE) 500 MG tablet     Vitamin D3 (CHOLECALCIFEROL) 25 mcg (1000 units) tablet     omeprazole (PRILOSEC) 40 MG DR capsule     Current Facility-Administered Medications   Medication     Bupivacaine 0.5 % Injection     dexamethasone (DECADRON) injection 4 mg     triamcinolone (KENALOG-40) injection 40 mg       Physical Exam:  ?  Vitals:  /78   Ht 1.854 m (6' 1\")   Wt 91.1 kg (200 lb 12.8 oz)   BMI 26.49 kg/m     General:  WD/WN, in NAD.  A&O x3.  Dermatologic:    Skin is intact, open lesions absent.   Skin texture, turgor is normal.  Vascular:  Pulses palpable bilateral.  Digital capillary refill time normal bilateral.  Skin temperature is normal to affected heel.  Generalized edema- none bilateral.  Focal edema- none heel right.  Neurologic:    Gross " sensation normal.  Gait and balance abnormal, antalgic, R.  Musculoskeletal:  Maximal pain to palpation of plantar heel R > L.  mild pain to palpation of posterior heel at Achilles insertionbilateral.  Ankle dorsiflexion <10 degrees with the knee extended, =10 degrees with the knee flexed.  STJ, MTJ ROM intact to affected extremity.  Muscle strength 5/5  foot and ankle to affected extremity.     Stance:  Foot type:  Valgus, L > R              Again, thank you for allowing me to participate in the care of your patient.        Sincerely,        Janell Barnhart DPM

## 2022-03-11 NOTE — PATIENT INSTRUCTIONS
"PATIENT INSTRUCTIONS - Podiatry / Foot & Ankle Surgery    Aftercare for Steroid Injection  Activity:  -Resume normal activity as tolerated.  -Tendon, ligament, fascia injectionons- avoid high-impact activity for 1 week.  Icing:  -May apply to the injection site if needed.  Infection:  -Risk is generally low (<1%), but must be aware of the signs/symptoms.    -Both infection and an inflammatory reaction to the steroid (\"steroid flare\") will cause redness, warmth, and increased pain.   -If these symptoms develop within 12-24h & resolve within 2-3 days- \"steroid flare\"- ice (non-worrisome)  -If these symptoms develop after 24-48h, progressively get worse, & are associated with a fever- possible infection; call the clinic or present to urgent care immediately      Calf muscle stretching 2-3x daily as instructed, both with the knees straight and the knees bent. Hold for 30-60 seconds.  For personal instruction on this,  please request a Physical Therapy referral from your doctor.      Low impact activity - cycling or swimming is best; then walking or elliptical.  Avoid running, jumping, and hard landings.      SuperFeet orthotics  SuperFeet are over the counter (OTC), you do not need a prescription.  Wear Superfeet orthotics in all of your shoes.  Remove the existing liner and replace it with SuperFeet.    SuperFeet are available on [employer], at [employer] and most specialty [employer].        "

## 2022-09-11 ENCOUNTER — HEALTH MAINTENANCE LETTER (OUTPATIENT)
Age: 62
End: 2022-09-11

## 2022-12-27 ASSESSMENT — ENCOUNTER SYMPTOMS
NAUSEA: 0
DIARRHEA: 0
ABDOMINAL PAIN: 0
CONSTIPATION: 0
BLOOD IN STOOL: 0
MYALGIAS: 0
ARTHRALGIAS: 1
HEARTBURN: 1
VOMITING: 1
MUSCLE CRAMPS: 0
JAUNDICE: 0
MUSCLE WEAKNESS: 1
BLOATING: 0
RECTAL PAIN: 0
BOWEL INCONTINENCE: 0
JOINT SWELLING: 0
STIFFNESS: 0
NECK PAIN: 0
BACK PAIN: 0

## 2022-12-28 ENCOUNTER — LAB (OUTPATIENT)
Dept: LAB | Facility: CLINIC | Age: 62
End: 2022-12-28
Payer: COMMERCIAL

## 2022-12-28 ENCOUNTER — MYC MEDICAL ADVICE (OUTPATIENT)
Dept: INTERNAL MEDICINE | Facility: CLINIC | Age: 62
End: 2022-12-28

## 2022-12-28 DIAGNOSIS — I25.10 CORONARY ARTERY DISEASE INVOLVING NATIVE CORONARY ARTERY OF NATIVE HEART WITHOUT ANGINA PECTORIS: ICD-10-CM

## 2022-12-28 DIAGNOSIS — E78.5 HYPERLIPIDEMIA, UNSPECIFIED HYPERLIPIDEMIA TYPE: Primary | ICD-10-CM

## 2022-12-28 DIAGNOSIS — E78.5 HYPERLIPIDEMIA, UNSPECIFIED HYPERLIPIDEMIA TYPE: ICD-10-CM

## 2022-12-28 LAB
ALBUMIN SERPL BCG-MCNC: 4.2 G/DL (ref 3.5–5.2)
ALP SERPL-CCNC: 69 U/L (ref 40–129)
ALT SERPL W P-5'-P-CCNC: 29 U/L (ref 10–50)
ANION GAP SERPL CALCULATED.3IONS-SCNC: 13 MMOL/L (ref 7–15)
AST SERPL W P-5'-P-CCNC: 31 U/L (ref 10–50)
BASOPHILS # BLD AUTO: 0 10E3/UL (ref 0–0.2)
BASOPHILS NFR BLD AUTO: 0 %
BILIRUB SERPL-MCNC: 0.4 MG/DL
BUN SERPL-MCNC: 16.5 MG/DL (ref 8–23)
CALCIUM SERPL-MCNC: 9.2 MG/DL (ref 8.8–10.2)
CHLORIDE SERPL-SCNC: 106 MMOL/L (ref 98–107)
CHOLEST SERPL-MCNC: 188 MG/DL
CREAT SERPL-MCNC: 0.88 MG/DL (ref 0.67–1.17)
DEPRECATED HCO3 PLAS-SCNC: 21 MMOL/L (ref 22–29)
EOSINOPHIL # BLD AUTO: 0.1 10E3/UL (ref 0–0.7)
EOSINOPHIL NFR BLD AUTO: 2 %
ERYTHROCYTE [DISTWIDTH] IN BLOOD BY AUTOMATED COUNT: 12.3 % (ref 10–15)
GFR SERPL CREATININE-BSD FRML MDRD: >90 ML/MIN/1.73M2
GLUCOSE SERPL-MCNC: 99 MG/DL (ref 70–99)
HCT VFR BLD AUTO: 44.1 % (ref 40–53)
HDLC SERPL-MCNC: 49 MG/DL
HGB BLD-MCNC: 15.6 G/DL (ref 13.3–17.7)
HOLD SPECIMEN: NORMAL
HOLD SPECIMEN: NORMAL
LDLC SERPL CALC-MCNC: 103 MG/DL
LYMPHOCYTES # BLD AUTO: 1.3 10E3/UL (ref 0.8–5.3)
LYMPHOCYTES NFR BLD AUTO: 21 %
MCH RBC QN AUTO: 33 PG (ref 26.5–33)
MCHC RBC AUTO-ENTMCNC: 35.4 G/DL (ref 31.5–36.5)
MCV RBC AUTO: 93 FL (ref 78–100)
MONOCYTES # BLD AUTO: 0.6 10E3/UL (ref 0–1.3)
MONOCYTES NFR BLD AUTO: 9 %
NEUTROPHILS # BLD AUTO: 4.3 10E3/UL (ref 1.6–8.3)
NEUTROPHILS NFR BLD AUTO: 68 %
NONHDLC SERPL-MCNC: 139 MG/DL
PLATELET # BLD AUTO: 210 10E3/UL (ref 150–450)
POTASSIUM SERPL-SCNC: 4.3 MMOL/L (ref 3.4–5.3)
PROT SERPL-MCNC: 6.4 G/DL (ref 6.4–8.3)
RBC # BLD AUTO: 4.73 10E6/UL (ref 4.4–5.9)
SODIUM SERPL-SCNC: 140 MMOL/L (ref 136–145)
TRIGL SERPL-MCNC: 178 MG/DL
WBC # BLD AUTO: 6.3 10E3/UL (ref 4–11)

## 2022-12-28 PROCEDURE — 85025 COMPLETE CBC W/AUTO DIFF WBC: CPT

## 2022-12-28 PROCEDURE — 36415 COLL VENOUS BLD VENIPUNCTURE: CPT

## 2022-12-28 PROCEDURE — 80061 LIPID PANEL: CPT

## 2022-12-28 PROCEDURE — 80053 COMPREHEN METABOLIC PANEL: CPT

## 2023-01-03 ENCOUNTER — OFFICE VISIT (OUTPATIENT)
Dept: INTERNAL MEDICINE | Facility: CLINIC | Age: 63
End: 2023-01-03
Payer: COMMERCIAL

## 2023-01-03 VITALS
WEIGHT: 204 LBS | HEART RATE: 92 BPM | OXYGEN SATURATION: 96 % | HEIGHT: 73 IN | SYSTOLIC BLOOD PRESSURE: 151 MMHG | BODY MASS INDEX: 27.04 KG/M2 | DIASTOLIC BLOOD PRESSURE: 101 MMHG

## 2023-01-03 DIAGNOSIS — R03.0 WHITE COAT SYNDROME WITHOUT DIAGNOSIS OF HYPERTENSION: Primary | ICD-10-CM

## 2023-01-03 DIAGNOSIS — E78.5 HYPERLIPIDEMIA, UNSPECIFIED HYPERLIPIDEMIA TYPE: ICD-10-CM

## 2023-01-03 PROCEDURE — 99396 PREV VISIT EST AGE 40-64: CPT | Performed by: INTERNAL MEDICINE

## 2023-01-03 RX ORDER — ATORVASTATIN CALCIUM 20 MG/1
20 TABLET, FILM COATED ORAL DAILY
Qty: 100 TABLET | Refills: 3 | Status: SHIPPED | OUTPATIENT
Start: 2023-01-03 | End: 2024-01-03

## 2023-01-03 NOTE — NURSING NOTE
Chris Kirkland is a 62 year old male patient that presents today in clinic for the following:    Chief Complaint   Patient presents with     Physical     Routine     The patient's allergies and medications were reviewed as noted. A set of vitals were recorded as noted without incident. The patient does not have any other questions for the provider.    Shahzad Parikh, EMT at 2:15 PM on 1/3/2023

## 2023-01-03 NOTE — PROGRESS NOTES
HPI  62-year-old  presents today for physical examination.  He has been experiencing some pain and discomfort in the right elbow and forearm.  This is insidious onset a couple of weeks ago but it did occur after he was shoveling snow.  He notices the pain in the area of the lateral epicondyle on the right radiates down into the forearm.  He has not had any redness or warmth.  He has been using heat on this area without significant improvement.  He has not been doing any regular blood pressure checks at home.  Most recent check was last year and it 122/88.  No other more recent blood pressures.  We discussed the benefits of a 24-hour blood pressure monitor and he declined.  He will start monitoring his blood pressure more closely at home and we reviewed nonpharmacologic blood pressure control measures.  He had his plantar fascia injected however this provided minimal improvement but some stability and it is no longer getting worse.  He is exercising 30 minutes 5 days a week.  No symptoms or problems in association with this  Past Medical History:   Diagnosis Date     Hyperlipidemia      Hypertension      Past Surgical History:   Procedure Laterality Date     COLONOSCOPY N/A 11/25/2019    Procedure: COLONOSCOPY, WITH POLYPECTOMY;  Surgeon: Luis Gustafson MD;  Location: [facility]     NO HISTORY OF SURGERY       Family History   Problem Relation Age of Onset     Parkinsonism Mother      Cancer Mother         thyroid     Heart Failure Father      Sleep Apnea Brother      Obesity Brother      Diabetes Brother      Parkinsonism Sister      Diabetes Brother      Glaucoma No family hx of      Macular Degeneration No family hx of      Eye Surgery No family hx of      Retinal detachment No family hx of      Answers for HPI/ROS submitted by the patient on 12/27/2022  General Symptoms: No  Skin Symptoms: No  HENT Symptoms: No  EYE SYMPTOMS: No  HEART SYMPTOMS: No  LUNG SYMPTOMS: No  INTESTINAL SYMPTOMS: Yes  URINARY  SYMPTOMS: No  REPRODUCTIVE SYMPTOMS: No  SKELETAL SYMPTOMS: Yes  BLOOD SYMPTOMS: No  NERVOUS SYSTEM SYMPTOMS: No  MENTAL HEALTH SYMPTOMS: No  Heart burn or indigestion: Yes  Nausea: No  Vomiting: Yes  Abdominal pain: No  Bloating: No  Constipation: No  Diarrhea: No  Blood in stool: No  Black stools: No  Rectal or Anal pain: No  Fecal incontinence: No  Yellowing of skin or eyes: No  Vomit with blood: No  Change in stools: No  Back pain: No  Muscle aches: No  Neck pain: No  Swollen joints: No  Joint pain: Yes  Muscle cramps: No  Muscle weakness: Yes  Joint stiffness: No  Bone fracture: No        Exam:  There were no vitals taken for this visit.  0 lbs 0 oz    Labs reviewed:   Latest Reference Range & Units 12/28/22 09:25   Sodium 136 - 145 mmol/L 140   Potassium 3.4 - 5.3 mmol/L 4.3   Chloride 98 - 107 mmol/L 106   Carbon Dioxide (CO2) 22 - 29 mmol/L 21 (L)   Urea Nitrogen 8.0 - 23.0 mg/dL 16.5   Creatinine 0.67 - 1.17 mg/dL 0.88   GFR Estimate >60 mL/min/1.73m2 >90   Calcium 8.8 - 10.2 mg/dL 9.2   Anion Gap 7 - 15 mmol/L 13   Albumin 3.5 - 5.2 g/dL 4.2   Protein Total 6.4 - 8.3 g/dL 6.4   Alkaline Phosphatase 40 - 129 U/L 69   ALT 10 - 50 U/L 29   AST 10 - 50 U/L 31   Bilirubin Total <=1.2 mg/dL 0.4   Cholesterol <200 mg/dL 188   Glucose 70 - 99 mg/dL 99   HDL Cholesterol >=40 mg/dL 49   LDL Cholesterol Calculated <=100 mg/dL 103 (H)   Non HDL Cholesterol <130 mg/dL 139 (H)   Triglycerides <150 mg/dL 178 (H)   (L): Data is abnormally low  (H): Data is abnormally high      ASSESSMENT  1 hypertension needs f?U  2 hyperlipidemia on atorvastatin  3 Mild 3 vessel CAD on CTA  4 BPH  5 Plantar fasciits mild improvement post injection  6 Gastritis resolved off omeprazole  7 Adenomatous colon polyp due for colonoscopy 2024  8  Right lateral epicondylitis    Plan  Reviewed his strengthening stretching program for the epicondylitis with him.  We also discussed nonpharmacologic blood pressure control measures and home blood  pressure monitoring and he will send me his blood pressure report in a couple of months.  We will increase his atorvastatin to 20 mg daily.  He is doing well off the omeprazole and so will not restart.  Will be due for colonoscopy in 2024 we will update his immunizations with a flu shot today he declined COVID booster    Over 40 minutes on the day of service spent in chart review, patient contact, record completion and review and notification of lab reports    This note was completed using Dragon voice recognition software.      Gus Estevez MD  General Internal Medicine  [facility]  [phone]

## 2023-08-02 ENCOUNTER — TRANSFERRED RECORDS (OUTPATIENT)
Dept: HEALTH INFORMATION MANAGEMENT | Facility: CLINIC | Age: 63
End: 2023-08-02
Payer: COMMERCIAL

## 2023-11-16 DIAGNOSIS — I25.10 CORONARY ARTERY DISEASE INVOLVING NATIVE CORONARY ARTERY OF NATIVE HEART WITHOUT ANGINA PECTORIS: ICD-10-CM

## 2023-11-16 DIAGNOSIS — E78.5 HYPERLIPIDEMIA, UNSPECIFIED HYPERLIPIDEMIA TYPE: Primary | ICD-10-CM

## 2023-12-28 ENCOUNTER — LAB (OUTPATIENT)
Dept: LAB | Facility: CLINIC | Age: 63
End: 2023-12-28
Payer: COMMERCIAL

## 2023-12-28 DIAGNOSIS — I25.10 CORONARY ARTERY DISEASE INVOLVING NATIVE CORONARY ARTERY OF NATIVE HEART WITHOUT ANGINA PECTORIS: ICD-10-CM

## 2023-12-28 DIAGNOSIS — E78.5 HYPERLIPIDEMIA, UNSPECIFIED HYPERLIPIDEMIA TYPE: ICD-10-CM

## 2023-12-28 LAB
ALBUMIN SERPL BCG-MCNC: 4.4 G/DL (ref 3.5–5.2)
ALP SERPL-CCNC: 74 U/L (ref 40–150)
ALT SERPL W P-5'-P-CCNC: 24 U/L (ref 0–70)
ANION GAP SERPL CALCULATED.3IONS-SCNC: 9 MMOL/L (ref 7–15)
AST SERPL W P-5'-P-CCNC: 29 U/L (ref 0–45)
BILIRUB SERPL-MCNC: 0.4 MG/DL
BUN SERPL-MCNC: 10.3 MG/DL (ref 8–23)
CALCIUM SERPL-MCNC: 9.4 MG/DL (ref 8.8–10.2)
CHLORIDE SERPL-SCNC: 102 MMOL/L (ref 98–107)
CHOLEST SERPL-MCNC: 160 MG/DL
CREAT SERPL-MCNC: 0.96 MG/DL (ref 0.67–1.17)
DEPRECATED HCO3 PLAS-SCNC: 28 MMOL/L (ref 22–29)
EGFRCR SERPLBLD CKD-EPI 2021: 88 ML/MIN/1.73M2
FASTING STATUS PATIENT QL REPORTED: YES
GLUCOSE SERPL-MCNC: 98 MG/DL (ref 70–99)
HDLC SERPL-MCNC: 52 MG/DL
LDLC SERPL CALC-MCNC: 79 MG/DL
NONHDLC SERPL-MCNC: 108 MG/DL
POTASSIUM SERPL-SCNC: 4.6 MMOL/L (ref 3.4–5.3)
PROT SERPL-MCNC: 6.7 G/DL (ref 6.4–8.3)
SODIUM SERPL-SCNC: 139 MMOL/L (ref 135–145)
TRIGL SERPL-MCNC: 147 MG/DL

## 2023-12-28 PROCEDURE — 80053 COMPREHEN METABOLIC PANEL: CPT

## 2023-12-28 PROCEDURE — 80061 LIPID PANEL: CPT

## 2023-12-28 PROCEDURE — 36415 COLL VENOUS BLD VENIPUNCTURE: CPT

## 2024-01-03 ENCOUNTER — OFFICE VISIT (OUTPATIENT)
Dept: INTERNAL MEDICINE | Facility: CLINIC | Age: 64
End: 2024-01-03
Payer: COMMERCIAL

## 2024-01-03 VITALS
BODY MASS INDEX: 27.74 KG/M2 | WEIGHT: 204.8 LBS | OXYGEN SATURATION: 96 % | DIASTOLIC BLOOD PRESSURE: 88 MMHG | HEART RATE: 77 BPM | HEIGHT: 72 IN | SYSTOLIC BLOOD PRESSURE: 140 MMHG

## 2024-01-03 DIAGNOSIS — L57.0 AK (ACTINIC KERATOSIS): ICD-10-CM

## 2024-01-03 DIAGNOSIS — Z86.0100 HISTORY OF COLONIC POLYPS: ICD-10-CM

## 2024-01-03 DIAGNOSIS — Z87.891 HISTORY OF CIGARETTE SMOKING: Primary | ICD-10-CM

## 2024-01-03 DIAGNOSIS — E78.5 HYPERLIPIDEMIA, UNSPECIFIED HYPERLIPIDEMIA TYPE: ICD-10-CM

## 2024-01-03 DIAGNOSIS — I10 BENIGN ESSENTIAL HYPERTENSION: ICD-10-CM

## 2024-01-03 PROCEDURE — 17000 DESTRUCT PREMALG LESION: CPT | Performed by: INTERNAL MEDICINE

## 2024-01-03 PROCEDURE — 99397 PER PM REEVAL EST PAT 65+ YR: CPT | Mod: 25 | Performed by: INTERNAL MEDICINE

## 2024-01-03 PROCEDURE — 17003 DESTRUCT PREMALG LES 2-14: CPT | Performed by: INTERNAL MEDICINE

## 2024-01-03 RX ORDER — LISINOPRIL 10 MG/1
10 TABLET ORAL AT BEDTIME
Qty: 90 TABLET | Refills: 3 | Status: SHIPPED | OUTPATIENT
Start: 2024-01-03

## 2024-01-03 RX ORDER — OMEPRAZOLE 40 MG/1
CAPSULE, DELAYED RELEASE ORAL
COMMUNITY
Start: 2022-01-02 | End: 2024-06-24

## 2024-01-03 RX ORDER — ATORVASTATIN CALCIUM 20 MG/1
20 TABLET, FILM COATED ORAL DAILY
Qty: 100 TABLET | Refills: 3 | Status: SHIPPED | OUTPATIENT
Start: 2024-01-03

## 2024-01-03 NOTE — PROGRESS NOTES
Medicare Annual Wellness Questionnaire:  This 63 year old year old male presents for a Medicare Wellness Exam.    Fall Risk Assessment:  Have you fallen 2 or more times in the last year?  No    How many times were you injured due to a fall in the last year?  none    PHQ-2:  Over the last 2 weeks, how often have you been bothered by feeling down, depressed, or hopeless?  Not at all (0)    Over the last 2 weeks, how often have you had little interest or pleasure in doing things?  Not at all (0)     Social History:  What is your marital status?      Who lives in your household?  wife    Does your home have loose rugs in the hallway:     Yes     Does your home have grab bars in the bathroom:    No    Does your home have handrails on the stairs?  Yes    Does your home have poorly lit areas?    no    Do you feel threatened or controlled by a partner, ex-partner or anyone in your life?   No    Has anyone hurt you physically, for example by pushing, hitting, slapping or kicking you or forcing you to have sex?   No    Do you need help with the phone, transportation, shopping, preparing meals, housework, laundry, medications or managing money?   No    Sexual Health:  Are you sexually active?    No    If yes, with men, women, or both?  N/A    If yes, how many partners?  N/A    If yes, are you using condoms?    N/A    Have you had any sexually transmitted infections in the last year?   No    Do you have any sexual concerns?    No    Women Only:  Women: What year did you stop having periods (approximate age)?  N/A    Women: Any vaginal bleeding in the last year?    N/A    Women: Have you ever had an abnormal Pap smear?    N/A    General Health Assessment:  Have you noticed any hearing difficulties?   Yes     Do you wear hearing aids?   Yes     Have you seen a hearing professional such as an audiologist in the last 1 year?   No    Do you have vision difficulty?    Yes     Do you wear glasses or contacts?   Yes     Have you  seen an eye doctor in the last 1 year?   No    How many servings of fruits and vegetables do you eat a day?  Fruit: 2  Vegetables: 2    How often do you exercise in a week?  5    How long and what kind of exercise do you do?  Walking and biking    Tobacco and Alcohol History:  Do you use tobacco/nicotine products?    No    If yes, please list the method of use and average weekly consumption?  N/A    Do you use any other drugs?   No         Do you drink alcohol?   Yes     If you drink alcohol, how many drinks per week?  5    Advanced Directive:  Have you completed an Advance Directives document?  Yes     If yes, have you given a copy to the clinic?       Do you need information on Advance Directives?   No    Adali Menchaca, EMT at 10:14 AM on 1/3/2024

## 2024-01-03 NOTE — PROGRESS NOTES
Chris is a 63 year old that presents in clinic today for the following:     Chief Complaint   Patient presents with    Physical           1/3/2024     7:50 AM   Additional Questions   Roomed by Adali Menchaca   Accompanied by N/A       Screenings as of today     PHQ-2 Total Score (Adult) - Positive if 3 or more points; Administer   PHQ-9 if positive 0        Adali Menchaca at 7:58 AM on 1/3/2024      HPI  63-year-old seen today for Medicare wellness visit with several concerns.  He was recently diagnosed with osteoarthritis of the knees and was told to quit running.  He has been doing more cycling and walking in response to this.  He has not been doing any strengthening exercises and we discussed the importance of quadricep strengthening exercises as well as proper bike fit with leg extension while cycling.  He had a tickle in the back of his throat and recent congestion this has been associated with periodic postnasal drip in the past he has not tried intranasal steroids and we recommended this.  He has had hearing aids but has had some difficulty with speech discrimination and he will have to follow-up with the hearing aid people regarding this and we also encouraged him to have the wax removed from his right ear.  He has been monitoring his blood pressure at home and this has been running high.  He is averaging in the 130s over 80s to 90s for the most part with the diastolic never dropping below 80 by his record.  We discussed the use of lisinopril for this as well as follow-up BMP.  He has had ongoing skin lesions 2 years ago.  Cryotherapy on the anterior chest this has persisted he has had a couple benign keratoses on the forehead and on the forearms he also has a few small apparent actinic keratoses on the forearms as well at this time.  We discussed vaccinations and recommended that he get the RSV shingles COVID and and Pneumovax and he will get this through [facility].  Past Medical History:   Diagnosis Date     "Hyperlipidemia     Hypertension      Past Surgical History:   Procedure Laterality Date    COLONOSCOPY N/A 11/25/2019    Procedure: COLONOSCOPY, WITH POLYPECTOMY;  Surgeon: Luis Gustafson MD;  Location: [facility] OR    NO HISTORY OF SURGERY       Family History   Problem Relation Age of Onset    Parkinsonism Mother     Cancer Mother         thyroid    Heart Failure Father     Sleep Apnea Brother     Obesity Brother     Diabetes Brother     Parkinsonism Sister     Diabetes Brother     Glaucoma No family hx of     Macular Degeneration No family hx of     Eye Surgery No family hx of     Retinal detachment No family hx of          Exam:  BP (!) 140/88 (BP Location: Right arm, Patient Position: Sitting, Cuff Size: Adult Regular)   Pulse 77   Ht 1.833 m (6' 0.17\")   Wt 92.9 kg (204 lb 12.8 oz)   SpO2 96%   BMI 27.65 kg/m    204 lbs 12.8 oz  Physical Exam   The patient is alert, oriented with a clear sensorium.   Skin shows a crusted lesion on the right upper arm 3 small apparent actinic keratosis on the right forearm which were treated with cryotherapy he has an erythematous spot on his chest which was also treated with cryotherapy he has several small nonpigmented seborrheic keratoses on his right hand left forearm and forehead.  Head is normocephalic and atraumatic.   Eyes show PERRLA  Ears show normal TMs bilaterally.   Mouth shows clear oral mucosa.   Neck shows no nodes, thyromegaly or bruits.   Back is non tender.  Lungs are clear to percussion and auscultation.   Heart shows normal S1 and S2 without murmur or gallop.   Abdomen is soft and nontender without masses or organomegaly.   Genitalia show normal testes. No evidence of inguinal hernia.  Rectal shows small smooth prostate without nodules or masses.  Rectal shows large smooth prostate without nodules or masses.  Extremities show no edema and no evidence of active synovitis.   Neurologic examination shows cranial nerves II-XII intact. Motor shows 5/5 strength. " Reflexes are symmetric. Cerebellar testing shows normal tandem gait.  Romberg negative.  Labs reviewed:   Latest Reference Range & Units 12/28/23 08:43   Sodium 135 - 145 mmol/L 139   Potassium 3.4 - 5.3 mmol/L 4.6   Chloride 98 - 107 mmol/L 102   Carbon Dioxide (CO2) 22 - 29 mmol/L 28   Urea Nitrogen 8.0 - 23.0 mg/dL 10.3   Creatinine 0.67 - 1.17 mg/dL 0.96   GFR Estimate >60 mL/min/1.73m2 88   Calcium 8.8 - 10.2 mg/dL 9.4   Anion Gap 7 - 15 mmol/L 9   Albumin 3.5 - 5.2 g/dL 4.4   Protein Total 6.4 - 8.3 g/dL 6.7   Alkaline Phosphatase 40 - 150 U/L 74   ALT 0 - 70 U/L 24   AST 0 - 45 U/L 29   Bilirubin Total <=1.2 mg/dL 0.4   Cholesterol <200 mg/dL 160   Patient Fasting?  Yes   Glucose 70 - 99 mg/dL 98   HDL Cholesterol >=40 mg/dL 52   LDL Cholesterol Calculated <=100 mg/dL 79   Non HDL Cholesterol <130 mg/dL 108   Triglycerides <150 mg/dL 147     Procedure note cryotherapy was used to treat apparent actinic keratoses on his right forearm 4 on the forearm 1 on the upper arm and an additional 1 on the upper chest he appeared to tolerate this well.      ASSESSMENT  1 hypertension needs treatment  2 hyperlipidemia on atorvastatin  3 Mild 3 vessel CAD on CTA  4 BPH  5 Plantar fasciits mild improvement post injection  6 Gastritis resolved off omeprazole  7 Adenomatous colon polyp due for colonoscopy 2024  8  AKs right forearm treated with cryotherapy  9 Post nasal drip recommend fluticasone  10 Right lateral epicondylitis resolved  11 bilateral knee osteoarthritis      Plan  Will start on lisinopril we will check his BMP in 2 to 4 weeks and we will have him follow-up with blood pressure reports in the next month or 2.  He smoked in the past so we will arrange for abdominal aortic aneurysm screening.  We discussed his immunizations which she will obtain through [facility].  We refilled his atorvastatin for the year.  Will have him use fluticasone for the postnasal drip and instruct him in quadricep strengthening exercises to  do for the knees.    This note was completed using Dragon voice recognition software.      Gus Estevez MD  General Internal Medicine  [facility]  [phone]

## 2024-01-08 ENCOUNTER — PATIENT OUTREACH (OUTPATIENT)
Dept: GASTROENTEROLOGY | Facility: CLINIC | Age: 64
End: 2024-01-08
Payer: COMMERCIAL

## 2024-04-05 ENCOUNTER — LAB (OUTPATIENT)
Dept: LAB | Facility: CLINIC | Age: 64
End: 2024-04-05
Payer: COMMERCIAL

## 2024-04-05 DIAGNOSIS — I10 BENIGN ESSENTIAL HYPERTENSION: ICD-10-CM

## 2024-04-05 LAB
ANION GAP SERPL CALCULATED.3IONS-SCNC: 10 MMOL/L (ref 7–15)
BUN SERPL-MCNC: 12.2 MG/DL (ref 8–23)
CALCIUM SERPL-MCNC: 9.1 MG/DL (ref 8.8–10.2)
CHLORIDE SERPL-SCNC: 106 MMOL/L (ref 98–107)
CREAT SERPL-MCNC: 0.99 MG/DL (ref 0.67–1.17)
DEPRECATED HCO3 PLAS-SCNC: 24 MMOL/L (ref 22–29)
EGFRCR SERPLBLD CKD-EPI 2021: 85 ML/MIN/1.73M2
GLUCOSE SERPL-MCNC: 93 MG/DL (ref 70–99)
POTASSIUM SERPL-SCNC: 4.2 MMOL/L (ref 3.4–5.3)
SODIUM SERPL-SCNC: 140 MMOL/L (ref 135–145)

## 2024-04-05 PROCEDURE — 36415 COLL VENOUS BLD VENIPUNCTURE: CPT

## 2024-04-05 PROCEDURE — 80048 BASIC METABOLIC PNL TOTAL CA: CPT

## 2024-05-23 ENCOUNTER — OFFICE VISIT (OUTPATIENT)
Dept: INTERNAL MEDICINE | Facility: CLINIC | Age: 64
End: 2024-05-23
Payer: COMMERCIAL

## 2024-05-23 ENCOUNTER — HOSPITAL ENCOUNTER (OUTPATIENT)
Dept: MRI IMAGING | Facility: CLINIC | Age: 64
Discharge: HOME | End: 2024-05-23
Attending: HOSPITALIST | Admitting: HOSPITALIST
Payer: COMMERCIAL

## 2024-05-23 ENCOUNTER — LAB (OUTPATIENT)
Dept: LAB | Facility: CLINIC | Age: 64
End: 2024-05-23
Payer: COMMERCIAL

## 2024-05-23 VITALS
OXYGEN SATURATION: 96 % | SYSTOLIC BLOOD PRESSURE: 115 MMHG | BODY MASS INDEX: 26.82 KG/M2 | HEART RATE: 85 BPM | WEIGHT: 198 LBS | HEIGHT: 72 IN | DIASTOLIC BLOOD PRESSURE: 79 MMHG

## 2024-05-23 DIAGNOSIS — R29.90 STROKE-LIKE EPISODE: Primary | ICD-10-CM

## 2024-05-23 DIAGNOSIS — Z11.59 NEED FOR HEPATITIS C SCREENING TEST: ICD-10-CM

## 2024-05-23 DIAGNOSIS — R29.90 STROKE-LIKE EPISODE: ICD-10-CM

## 2024-05-23 LAB
ANION GAP SERPL CALCULATED.3IONS-SCNC: 9 MMOL/L (ref 7–15)
BUN SERPL-MCNC: 8.6 MG/DL (ref 8–23)
CALCIUM SERPL-MCNC: 9.5 MG/DL (ref 8.8–10.2)
CHLORIDE SERPL-SCNC: 106 MMOL/L (ref 98–107)
CREAT SERPL-MCNC: 0.97 MG/DL (ref 0.67–1.17)
DEPRECATED HCO3 PLAS-SCNC: 27 MMOL/L (ref 22–29)
EGFRCR SERPLBLD CKD-EPI 2021: 86 ML/MIN/1.73M2
ERYTHROCYTE [DISTWIDTH] IN BLOOD BY AUTOMATED COUNT: 12.2 % (ref 10–15)
GLUCOSE SERPL-MCNC: 87 MG/DL (ref 70–99)
HBA1C MFR BLD: 5.8 %
HCT VFR BLD AUTO: 44.2 % (ref 40–53)
HGB BLD-MCNC: 15.3 G/DL (ref 13.3–17.7)
MAGNESIUM SERPL-MCNC: 2.3 MG/DL (ref 1.7–2.3)
MCH RBC QN AUTO: 31.6 PG (ref 26.5–33)
MCHC RBC AUTO-ENTMCNC: 34.6 G/DL (ref 31.5–36.5)
MCV RBC AUTO: 91 FL (ref 78–100)
PLATELET # BLD AUTO: 208 10E3/UL (ref 150–450)
POTASSIUM SERPL-SCNC: 4.7 MMOL/L (ref 3.4–5.3)
RBC # BLD AUTO: 4.84 10E6/UL (ref 4.4–5.9)
SODIUM SERPL-SCNC: 142 MMOL/L (ref 135–145)
WBC # BLD AUTO: 4.8 10E3/UL (ref 4–11)

## 2024-05-23 PROCEDURE — 83036 HEMOGLOBIN GLYCOSYLATED A1C: CPT | Performed by: HOSPITALIST

## 2024-05-23 PROCEDURE — 83735 ASSAY OF MAGNESIUM: CPT | Performed by: PATHOLOGY

## 2024-05-23 PROCEDURE — 85027 COMPLETE CBC AUTOMATED: CPT | Performed by: PATHOLOGY

## 2024-05-23 PROCEDURE — 255N000002 HC RX 255 OP 636: Performed by: HOSPITALIST

## 2024-05-23 PROCEDURE — A9585 GADOBUTROL INJECTION: HCPCS | Performed by: HOSPITALIST

## 2024-05-23 PROCEDURE — 36415 COLL VENOUS BLD VENIPUNCTURE: CPT | Performed by: PATHOLOGY

## 2024-05-23 PROCEDURE — 70553 MRI BRAIN STEM W/O & W/DYE: CPT

## 2024-05-23 PROCEDURE — 99000 SPECIMEN HANDLING OFFICE-LAB: CPT | Performed by: PATHOLOGY

## 2024-05-23 PROCEDURE — 80048 BASIC METABOLIC PNL TOTAL CA: CPT | Performed by: PATHOLOGY

## 2024-05-23 PROCEDURE — 99213 OFFICE O/P EST LOW 20 MIN: CPT | Mod: 25

## 2024-05-23 PROCEDURE — 70553 MRI BRAIN STEM W/O & W/DYE: CPT | Mod: 26 | Performed by: RADIOLOGY

## 2024-05-23 PROCEDURE — 93246 EXT ECG>7D<15D RECORDING: CPT

## 2024-05-23 RX ORDER — GADOBUTROL 604.72 MG/ML
10 INJECTION INTRAVENOUS ONCE
Status: COMPLETED | OUTPATIENT
Start: 2024-05-23 | End: 2024-05-23

## 2024-05-23 RX ADMIN — GADOBUTROL 10 ML: 604.72 INJECTION INTRAVENOUS at 15:37

## 2024-05-23 NOTE — PROGRESS NOTES
Patient Chris Kirkland arrived here on 5/23/2024 at 9:15 AM for 14 day Zio monitor placement per ordering provider Dr. James.  Patient's skin was prepped per protocol. Zio monitor was placed. Instructions were reviewed with and given to the patient.  Patient verbalized understanding of wear, troubleshooting, and monitor return instructions. The patient was later scheduled for a MRI, so the patient came back and the original Zio was removed. The patient will be returning after their MRI today to get a Zio patch reapplied. The patient arrived after the MRI and a new Zio with a new serial number for placed without incident. It is was at 4pm on 5/23/2024 for the 14 day Zio monitor.    Monique Pemberton, EMT at 9:16 AM on 5/23/2024

## 2024-05-23 NOTE — PROGRESS NOTES
"  Assessment & Plan       Stroke-like episode  Patient reported eye strain and word finding difficulty last week which was new for him. The episode only lasted for ~2-5 minutes but raises concern for a possible TIA vs other etiology which should be further evaluated. His case was discussed with the on-call Neurology team who recommended an MRI and work up.   - US Carotid Bilateral  - Echocardiogram Complete  - CBC with platelets  - Basic metabolic panel  (Ca, Cl, CO2, Creat, Gluc, K, Na, BUN)  - Magnesium  - Hemoglobin A1c  - MR Brain w/o & w Contrast  - ZIO PATCH 14 days          BMI  Estimated body mass index is 26.73 kg/m  as calculated from the following:    Height as of this encounter: 1.833 m (6' 0.17\").    Weight as of this encounter: 89.8 kg (198 lb).     No follow-ups on file.    Mónica Perez is a 64 year old, presenting for the following health issues:  Follow Up (See pre check in )      5/23/2024     8:11 AM   Additional Questions   Roomed by SK EMT     History of Present Illness       Reason for visit:  Working at my computer, my eyes began to hurt, vision a little blurry. Similar to past eye strain (too much computer time). But words I was typing weren't right. Each word valid, but not intended; sentences made no sense. Lasted less than five minutes.  Symptom onset:  3-7 days ago  Symptoms include:  Nothing current -- all seemed to have resolved, and no recurrence.  (I should add -- though I think this is unrelated -- that I have had a cold for about five weeks now; it seemed to improve, then I got it again (after my wife got it from me).  Symptom intensity:  Mild  Symptom progression:  Improving  Had these symptoms before:  Yes  Has tried/received treatment for these symptoms:  No  What makes it worse:  For eye strain:  excessive computer time.  What makes it better:  For eye strain: closing my eyes and resting.    He eats 2-3 servings of fruits and vegetables daily.He consumes 1 sweetened " "beverage(s) daily.He exercises with enough effort to increase his heart rate 30 to 60 minutes per day.  He exercises with enough effort to increase his heart rate 4 days per week.   He is taking medications regularly.     Mr. Kirkland is 64 year old M with a pertinent past medical history of HLD, HTN and presbyopia who is seen after an episode of difficulty word finding and strained vision. He states his symptoms started on 5/14 around 3pm. He was typing a calendar invitation when he started having eye strain and noticed that the words he was writing down were different than what he intended. He further describes his difficulty with words as not being able to write down the intended word correctly. He states that the words started with the same letter he intended but was not the correct word. He did not speak audibly during this time and was unable to comment on if that was affected as well. He states that this episode lasted for about two minutes. He states that his eye strain was nothing new and resolved after ~ five minutes. He has never had an episode like this before and is now back at his baseline. He denies experiencing any symptoms of fevers, chills over the last two weeks, headaches or new weakness. Notably, the patient has been sick with a cold for the last five weeks. He has had two concussions previously in 2019 and 1979. He denies any recent head trauma or car accidents. He also reports he has intentionally lost four pounds since the start of the month. He has no further concerns or questions at this time.     Objective    /79 (BP Location: Right arm, Patient Position: Sitting, Cuff Size: Adult Regular)   Pulse 85   Ht 1.833 m (6' 0.17\")   Wt 89.8 kg (198 lb)   SpO2 96%   BMI 26.73 kg/m    Body mass index is 26.73 kg/m .    Physical Exam     General: Alert and oriented without distress  HEENT: Normocephalic/atraumatic  Respiratory: Patient breathing comfortably on room air without increased " respiratory effort or accessory muscle use  Cardiovascular: Appears well perfused  Abdomen: Non-distended  Skin: No overt lesions, bruises, rashes or bleeding on exposed skin surfaces.  Neuro: Alert & oriented. CN II-XII intact. Sensation reported to be equal and symmetric in upper and lower extremities bilaterally. Strength equal and symmetric in upper and lower extremities bilaterally. Able to repeat and recall three words.    Patient seen and case discussed with Dr. Albert who agrees with the above assessment and plan.     Jacek James,   PGY-3, Internal Medicine  [facility]

## 2024-06-07 ENCOUNTER — ANCILLARY PROCEDURE (OUTPATIENT)
Dept: ULTRASOUND IMAGING | Facility: CLINIC | Age: 64
End: 2024-06-07
Attending: HOSPITALIST
Payer: COMMERCIAL

## 2024-06-07 ENCOUNTER — ANCILLARY PROCEDURE (OUTPATIENT)
Dept: CARDIOLOGY | Facility: CLINIC | Age: 64
End: 2024-06-07
Attending: HOSPITALIST
Payer: COMMERCIAL

## 2024-06-07 DIAGNOSIS — R29.90 STROKE-LIKE EPISODE: ICD-10-CM

## 2024-06-07 LAB — LVEF ECHO: NORMAL

## 2024-06-07 PROCEDURE — 93880 EXTRACRANIAL BILAT STUDY: CPT | Performed by: RADIOLOGY

## 2024-06-07 PROCEDURE — 93306 TTE W/DOPPLER COMPLETE: CPT | Performed by: INTERNAL MEDICINE

## 2024-06-24 ENCOUNTER — OFFICE VISIT (OUTPATIENT)
Dept: INTERNAL MEDICINE | Facility: CLINIC | Age: 64
End: 2024-06-24
Payer: COMMERCIAL

## 2024-06-24 ENCOUNTER — MYC MEDICAL ADVICE (OUTPATIENT)
Dept: INTERNAL MEDICINE | Facility: CLINIC | Age: 64
End: 2024-06-24

## 2024-06-24 VITALS
HEART RATE: 69 BPM | SYSTOLIC BLOOD PRESSURE: 125 MMHG | BODY MASS INDEX: 28.14 KG/M2 | WEIGHT: 208.4 LBS | DIASTOLIC BLOOD PRESSURE: 85 MMHG | OXYGEN SATURATION: 97 %

## 2024-06-24 DIAGNOSIS — R29.90 STROKE-LIKE EPISODE: Primary | ICD-10-CM

## 2024-06-24 DIAGNOSIS — E78.5 HYPERLIPIDEMIA, UNSPECIFIED HYPERLIPIDEMIA TYPE: Primary | ICD-10-CM

## 2024-06-24 PROCEDURE — 99207 ZIO PATCH 8-14 DAYS APPLICATION: CPT | Performed by: INTERNAL MEDICINE

## 2024-06-24 PROCEDURE — 99213 OFFICE O/P EST LOW 20 MIN: CPT | Mod: GC | Performed by: INTERNAL MEDICINE

## 2024-06-24 NOTE — PROGRESS NOTES
Chris is a 64 year old, presenting for the following health issues:  Follow Up (Pt here to follow up on stroke-like episode, would like to review imaging/lab results)          6/24/2024     8:19 AM   Additional Questions   Roomed by Sommer RADER     History of Present Illness       Reason for visit:  Overall assessment from word-finding incident in May.  As I read the test results, there is no  evidence that I suffered a stroke, but also no other explanation for the incident.    He eats 2-3 servings of fruits and vegetables daily.He consumes 1 sweetened beverage(s) daily.He exercises with enough effort to increase his heart rate 20 to 29 minutes per day.  He exercises with enough effort to increase his heart rate 4 days per week.   He is taking medications regularly.    Chris Kirkland is a 64 year old male with concerns including:  Chief Complaint   Patient presents with    Follow Up     Pt here to follow up on stroke-like episode, would like to review imaging/lab results     PCP: Gus Estevez    Assessment & Plan   #Stroke-like episode  Seen by Dr. James in May 2024, working diagnosis was TIA vs other etiology at that time, full neurological workup ordered. Echo/MRI brain/Carotid US/lab results unrevealing except for an A1C of 5.8%. Based on these findings, TIA/CVA much less likely. Also unlikely to be a minor seizure based on lack of post-event confusion, awareness of episode, no inciting events, or history of seizures. Exact etiology remains unclear. Would not recommend starting DAPT/mono-therapy at this time due to risk/benefits in a patient without clear first time cardiovascular event. Discussed this with patient and he agrees to continue current meds.    -Will follow-up on unread Zio monitor  -Continue lisinopril, atorvastatin  -Patient will follow-up if a similar event occurs in the future  -Encouraged pt to continue healthy lifestyle choices.    #HTN  Well controlled  "today  -Continue Lisinopril 20mg    #HLD  Last ldl 79  -Continue atorvastatin 20mg    Subjective     Pt described again his episode in May 2024. State he was typing at work, and had a 3 minute episode where he was unable to type words he wanted to (instead would type correctly spelled words that were similar, e.g \"tilt instead of turn.\") Denied any other motor/sensory issues, chest pain, shortness of breath, or confusion.      Objective     /85 (BP Location: Right arm, Patient Position: Sitting, Cuff Size: Adult Large)   Pulse 69   Wt 94.5 kg (208 lb 6.4 oz)   SpO2 97%   BMI 28.14 kg/m      Physical Exam  Constitutional:       Appearance: Normal appearance.   HENT:      Head: Normocephalic and atraumatic.      Mouth/Throat:      Mouth: Mucous membranes are moist.      Pharynx: Oropharynx is clear.   Eyes:      Extraocular Movements: Extraocular movements intact.      Conjunctiva/sclera: Conjunctivae normal.      Pupils: Pupils are equal, round, and reactive to light.   Neck:      Vascular: No carotid bruit.   Cardiovascular:      Rate and Rhythm: Normal rate and regular rhythm.      Heart sounds: No murmur heard.     No friction rub. No gallop.   Pulmonary:      Effort: Pulmonary effort is normal.      Breath sounds: Normal breath sounds.   Musculoskeletal:         General: No swelling, tenderness or signs of injury.      Cervical back: Normal range of motion.   Skin:     General: Skin is warm and dry.      Capillary Refill: Capillary refill takes less than 2 seconds.   Neurological:      General: No focal deficit present.      Mental Status: He is alert and oriented to person, place, and time. Mental status is at baseline.      Cranial Nerves: No cranial nerve deficit.      Sensory: No sensory deficit.   Psychiatric:         Mood and Affect: Mood normal.         Behavior: Behavior normal.        Pt was seen and examined with Dr. Wiley.  I agree with his documentation as noted above.    My additional " comments: None    Gus Estevez MD

## 2024-06-27 NOTE — TELEPHONE ENCOUNTER
Left Voicemail (2nd Attempt) for the patient to call back and schedule the following:    Appointment type: [facility] NURSE  Provider: Per PCP  Return date: Per pt  Specialty phone number: [phone]  Additional appointment(s) needed: -  Additonal Notes: for Zio Patch-

## 2024-06-28 ENCOUNTER — ALLIED HEALTH/NURSE VISIT (OUTPATIENT)
Dept: INTERNAL MEDICINE | Facility: CLINIC | Age: 64
End: 2024-06-28
Payer: COMMERCIAL

## 2024-06-28 DIAGNOSIS — R29.90 STROKE-LIKE EPISODE: Primary | ICD-10-CM

## 2024-06-28 PROCEDURE — 99207 PR NO CHARGE NURSE ONLY: CPT

## 2024-06-28 PROCEDURE — 93246 EXT ECG>7D<15D RECORDING: CPT

## 2024-06-28 NOTE — PROGRESS NOTES
Patient Chris Kirkland arrived here on 6/28/2024 at 8:32 AM for 14 day Zio monitor placement per ordering provider Dr. Estevez.  Patient's skin was prepped per protocol. Zio monitor was placed. Instructions were reviewed with and given to the patient.  Patient verbalized understanding of wear, troubleshooting, and monitor return instructions.        Monique Pemberton, EMT at 8:32 AM on 6/28/2024

## 2024-07-20 PROCEDURE — 93248 EXT ECG>7D<15D REV&INTERPJ: CPT | Performed by: INTERNAL MEDICINE

## 2024-09-05 ENCOUNTER — TRANSFERRED RECORDS (OUTPATIENT)
Dept: HEALTH INFORMATION MANAGEMENT | Facility: CLINIC | Age: 64
End: 2024-09-05
Payer: COMMERCIAL

## 2025-01-02 ENCOUNTER — PATIENT OUTREACH (OUTPATIENT)
Dept: GASTROENTEROLOGY | Facility: CLINIC | Age: 65
End: 2025-01-02
Payer: COMMERCIAL

## 2025-01-02 DIAGNOSIS — Z12.11 SPECIAL SCREENING FOR MALIGNANT NEOPLASMS, COLON: Primary | ICD-10-CM

## 2025-01-02 NOTE — PROGRESS NOTES
"[facility] Screening Colonoscopy Referral Review    Patient meets the inclusion criteria for screening colonoscopy standing order.    Ordering/Referring Provider:  Gus Estevez      BMI: Estimated body mass index is 28.14 kg/m  as calculated from the following:    Height as of 5/23/24: 1.833 m (6' 0.17\").    Weight as of 6/24/24: 94.5 kg (208 lb 6.4 oz).     Sedation:  Does patient have any of the following conditions affecting sedation?  No medical conditions affecting sedation.    Previous Scopes:  Any previous recommendations or follow up needs based on previous scope?  na / No recommendations.    Medical Concerns to Postpone Order:  Does patient have any of the following medical concerns that should postpone/delay colonoscopy referral?  No medical conditions affecting colonoscopy referral.    Final Referral Details:  Based on patient's medical history patient is appropriate for referral order with moderate sedation. If patient's BMI > 50 do not schedule in [facility].  "

## 2025-01-03 ENCOUNTER — MYC MEDICAL ADVICE (OUTPATIENT)
Dept: INTERNAL MEDICINE | Facility: CLINIC | Age: 65
End: 2025-01-03
Payer: COMMERCIAL

## 2025-01-03 DIAGNOSIS — E78.5 HYPERLIPIDEMIA, UNSPECIFIED HYPERLIPIDEMIA TYPE: Primary | ICD-10-CM

## 2025-01-03 DIAGNOSIS — I10 BENIGN ESSENTIAL HYPERTENSION: ICD-10-CM

## 2025-02-04 SDOH — HEALTH STABILITY: PHYSICAL HEALTH: ON AVERAGE, HOW MANY MINUTES DO YOU ENGAGE IN EXERCISE AT THIS LEVEL?: 30 MIN

## 2025-02-04 SDOH — HEALTH STABILITY: PHYSICAL HEALTH: ON AVERAGE, HOW MANY DAYS PER WEEK DO YOU ENGAGE IN MODERATE TO STRENUOUS EXERCISE (LIKE A BRISK WALK)?: 4 DAYS

## 2025-02-04 ASSESSMENT — SOCIAL DETERMINANTS OF HEALTH (SDOH): HOW OFTEN DO YOU GET TOGETHER WITH FRIENDS OR RELATIVES?: TWICE A WEEK

## 2025-02-05 ENCOUNTER — OFFICE VISIT (OUTPATIENT)
Dept: INTERNAL MEDICINE | Facility: CLINIC | Age: 65
End: 2025-02-05
Payer: COMMERCIAL

## 2025-02-05 VITALS
WEIGHT: 200.3 LBS | BODY MASS INDEX: 27.13 KG/M2 | HEIGHT: 72 IN | SYSTOLIC BLOOD PRESSURE: 127 MMHG | OXYGEN SATURATION: 99 % | HEART RATE: 83 BPM | DIASTOLIC BLOOD PRESSURE: 89 MMHG

## 2025-02-05 DIAGNOSIS — E78.5 HYPERLIPIDEMIA, UNSPECIFIED HYPERLIPIDEMIA TYPE: ICD-10-CM

## 2025-02-05 DIAGNOSIS — L57.0 ACTINIC KERATOSIS: Primary | ICD-10-CM

## 2025-02-05 PROCEDURE — 99397 PER PM REEVAL EST PAT 65+ YR: CPT | Performed by: INTERNAL MEDICINE

## 2025-02-05 RX ORDER — FLUOCINONIDE 0.5 MG/G
CREAM TOPICAL 2 TIMES DAILY
Qty: 120 G | Refills: 3 | Status: SHIPPED | OUTPATIENT
Start: 2025-02-05

## 2025-02-05 RX ORDER — ATORVASTATIN CALCIUM 20 MG/1
20 TABLET, FILM COATED ORAL DAILY
Qty: 90 TABLET | Refills: 3 | Status: SHIPPED | OUTPATIENT
Start: 2025-02-05

## 2025-02-05 NOTE — PROGRESS NOTES
"HPI  64-year-old  presents today for Medicare wellness visit.  He has had several concerns.  The main 1 is that of persistent inflammatory keratotic lesions predominantly on his forearms but to a lesser degree on his back and thighs.  He has been using moisturizing lotions and ammonium lactate without significant improvement or resolution with this.  He has been exercising regularly largely cycling now his knee arthritis precludes him from doing any running but he is indoor cycling this time a year doing some walking weather permitting.  We discussed the importance of quadricep strengthening and knee exercises I provided him some samples of this.  She had a recent cold has had some cough he has chronic snoring particularly on his back but his wife seems to correct this.  He has had no problems with the atorvastatin and we renewed this.  Past Medical History:   Diagnosis Date    Arthritis     Hyperlipidemia     Hypertension      Past Surgical History:   Procedure Laterality Date    COLONOSCOPY N/A 2019    Procedure: COLONOSCOPY, WITH POLYPECTOMY;  Surgeon: Luis Gustafson MD;  Location: [facility] OR    NO HISTORY OF SURGERY       Family History   Problem Relation Age of Onset    Parkinsonism Mother     Cancer Mother         thyroid    Thyroid Disease Mother          age 87    Heart Failure Father     Sleep Apnea Brother     Obesity Brother         Alive at age 67    Diabetes Brother     Parkinsonism Sister     Diabetes Brother     Hyperlipidemia Sister         on statin    Breast Cancer Sister     Colon Cancer Other         Uncle    Glaucoma No family hx of     Macular Degeneration No family hx of     Eye Surgery No family hx of     Retinal detachment No family hx of          Exam:  /89 (BP Location: Right arm, Patient Position: Sitting, Cuff Size: Adult Regular)   Pulse 83   Ht 1.83 m (6' 0.05\")   Wt 90.9 kg (200 lb 4.8 oz)   SpO2 99%   BMI 27.13 kg/m    200 lbs 4.8 oz  Physical Exam   The " patient is alert, oriented with a clear sensorium.   Skin shows multiple irritated and inflamed keratoses predominantly on the forearms to a lesser extent on the back and thighs  Head is normocephalic and atraumatic.   Eyes show PERRLA   Ears show Q-tip impacted cerumen bilaterally.   Mouth shows clear oral mucosa.   Neck shows no nodes, thyromegaly or bruits.   Back is non tender.  Lungs are clear to percussion and auscultation.   Heart shows normal S1 and S2 without murmur or gallop.   Abdomen is soft and nontender without masses or organomegaly.   Genitalia show tender testes. No evidence of inguinal hernia.  Rectal shows posterior enlarged prostate without nodules or masses.  Extremities show no edema and no evidence of active synovitis.   Neurologic examination shows cranial nerves II-XII intact. Motor shows 5/5 strength. Reflexes are symmetric. Cerebellar testing shows normal tandem gait.  Romberg negative.      ASSESSMENT  1 History TIA  2 hypertension controlled  3 Mild 3 vessel CAD on CTA  4 hyperlipidemia on atorvastatin  5 multiple inflammatory keratoses  6 Gastritis resolved off omeprazole  7 Adenomatous colon polyp due for colonoscopy  8  AKs right forearm treated with cryotherapy  9 Post nasal drip recommend fluticasone  10 Right lateral epicondylitis resolved  11 bilateral knee osteoarthritis  12 BPH  13 Snoring    Plan  To have him use Lidex for the inflammatory component of the keratoses him follow-up with dermatology regarding this and I placed the referral.  He is due for colonoscopy this year and that referral has already been placed and he has been contacted to get this scheduled.  Will continue him on the atorvastatin I gave him neuromotor knee strengthening exercises to do and we will have him plan follow-up for reassessment in a year    This note was completed using Dragon voice recognition software.      Gus Estevez MD  General Internal Medicine  Primary Care  [place]  [phone]

## 2025-02-05 NOTE — PROGRESS NOTES
Chris Kirkland is a 64 year old male that presents in clinic today for the following:     Chief Complaint   Patient presents with    Physical     Rash on his arms     The patient's allergies and medications were reviewed. The patient's vitals were obtained without incident.     [place], [place]   Primary Care Clinic

## 2025-02-06 ENCOUNTER — TELEPHONE (OUTPATIENT)
Dept: DERMATOLOGY | Facility: CLINIC | Age: 65
End: 2025-02-06
Payer: COMMERCIAL

## 2025-02-06 NOTE — TELEPHONE ENCOUNTER
Spoke with pt. Pt states his provider wanted him to be seen 2 weeks after starting the Lidex cream he prescribed today. Scheduled pt for sooner appt to assess response.     Cici GARCIA RN  Dermatology Surgery

## 2025-03-12 ENCOUNTER — OFFICE VISIT (OUTPATIENT)
Dept: DERMATOLOGY | Facility: CLINIC | Age: 65
End: 2025-03-12
Payer: COMMERCIAL

## 2025-03-12 DIAGNOSIS — L57.0 ACTINIC KERATOSIS: Primary | ICD-10-CM

## 2025-03-12 PROCEDURE — 1126F AMNT PAIN NOTED NONE PRSNT: CPT | Performed by: STUDENT IN AN ORGANIZED HEALTH CARE EDUCATION/TRAINING PROGRAM

## 2025-03-12 PROCEDURE — 17000 DESTRUCT PREMALG LESION: CPT | Performed by: STUDENT IN AN ORGANIZED HEALTH CARE EDUCATION/TRAINING PROGRAM

## 2025-03-12 PROCEDURE — 99204 OFFICE O/P NEW MOD 45 MIN: CPT | Mod: 25 | Performed by: STUDENT IN AN ORGANIZED HEALTH CARE EDUCATION/TRAINING PROGRAM

## 2025-03-12 PROCEDURE — 17003 DESTRUCT PREMALG LES 2-14: CPT | Performed by: STUDENT IN AN ORGANIZED HEALTH CARE EDUCATION/TRAINING PROGRAM

## 2025-03-12 RX ORDER — FLUOROURACIL 50 MG/G
CREAM TOPICAL
Qty: 40 G | Refills: 0 | Status: SHIPPED | OUTPATIENT
Start: 2025-03-12

## 2025-03-12 RX ORDER — CALCIPOTRIENE 50 UG/G
OINTMENT TOPICAL
Qty: 90 G | Refills: 0 | Status: SHIPPED | OUTPATIENT
Start: 2025-03-12

## 2025-03-12 ASSESSMENT — PAIN SCALES - GENERAL: PAINLEVEL_OUTOF10: NO PAIN (0)

## 2025-03-12 NOTE — PROGRESS NOTES
[facility] Dermatology Note  Encounter Date: Mar 12, 2025  Office Visit     Dermatology Problem List:  1. Aks - cryo    ____________________________________________    Assessment & Plan:     #. Actinic keratoses  - Reviewed nature and etiology, and pre-malignant potential  - Monitoring vs cryotherapy. Patient opted for spot treatment with cryotherapy today, see procedure note below. Aftercare reviewed.  - Other treatments include: PDT, Efudex and Calcipotriene, chemical peels, skin resurfacing.  - RX: Effudex + calcipotriene. Apply BID to arms x5 days. Once tolerated and the course of treatment anticipated, would also treat scalp.  Do this in a stepwise fashion - first arms, then break for one week, then scalp. If unable to fill in cost effective manner through outpatient pharmacy, will send to [facility] - pt plans to message via [employer].    - Continue sun protection and sun avoidance. Reviewed physical barriers    Aks treated today:  Right cheek 3  L forehead 1  Scalp 4  Arms 3    # Benign lesions: Seborrheic keratoses, lentigines, cherry angioma, and multiple benign nevi, sebaceous hyperplasia  - Reassurance provided; no treatment is medically necessary    # Stucco keratoses      Procedures Performed:   - Cryotherapy procedure note, location(s):   Right cheek 3  L forehead 1  Scalp 4  Arms 3.   After verbal consent and discussion of risks and benefits including, but not limited to, dyspigmentation/scar, and pain, 11 lesion(s) was(were) treated with 1-2 mm freeze border for 1-2 cycles with liquid nitrogen. Post cryotherapy instructions were provided.      Follow-up: 1 year(s) in-person, or earlier for new or changing lesions    Staff:     Luz Ignacio MD PhD  Dermatology, Dermatopathology    ____________________________________________    CC: Derm Problem (Actinic keratosis: arms and back, using fluocinonide/Lesion of concern: left undereye, and wants thighs  checked)      HPI:  Chris Kirkland is a(n) 64 year old Male who presents today as a new patient for TBSE and spot checks. Some scattered scaly papules on arms and others. Pt was referred by PCP.   He has some erythematous, scaly plaques on the arms. Was given topical steroid (lidex) by PCP to treat as dermatitis. Some improvement.    Has used liquid nitrogen in the past. No prior field treatments.    Patient is otherwise feeling well, without additional skin concerns.     Labs Reviewed:    Lab Results   Component Value Date    WBC 6.6 01/31/2025    HGB 15.7 01/31/2025    HCT 44.9 01/31/2025     01/31/2025     01/31/2025    POTASSIUM 4.2 01/31/2025    CHLORIDE 104 01/31/2025    CO2 29 01/31/2025    BUN 10.1 01/31/2025    CR 1.03 01/31/2025    GLC 92 01/31/2025    AST 30 01/31/2025    ALT 25 01/31/2025    ALKPHOS 75 01/31/2025    BILITOTAL 0.5 01/31/2025        Physical Exam:  Vitals: There were no vitals taken for this visit.  SKIN: Total skin excluding the undergarment areas was performed. The exam included the head/face, neck, both arms, chest, back, abdomen, both legs, digits and/or nails.   - Bilateral forearms with scattered pink scaly papules as well as white, gritty stuck on papules     - under eye verrucous gritty papule  Gritty pink papules:   Right cheek, L forehead, Scalp some with excoriation, Arms scattered areas    - No other lesions of concern on areas examined.       Medications:  Current Outpatient Medications   Medication Sig Dispense Refill    atorvastatin (LIPITOR) 20 MG tablet Take 1 tablet (20 mg) by mouth daily. 90 tablet 3    fluocinonide (LIDEX) 0.05 % external cream Apply topically 2 times daily. 120 g 3    lisinopril (ZESTRIL) 10 MG tablet Take 1 tablet (10 mg) by mouth at bedtime. 90 tablet 1    Vitamin D3 (CHOLECALCIFEROL) 25 mcg (1000 units) tablet Take by mouth daily       Current Facility-Administered Medications   Medication Dose Route Frequency Provider Last Rate Last  Admin    Bupivacaine 0.5 % Injection  1 mL INTRA-ARTICULAR Once Janell Barnhart DPM          Past Medical History:   Patient Active Problem List   Diagnosis    Cataracts, bilateral    Myopic astigmatism of both eyes    Presbyopia    Hyperlipidemia       Past Medical History:   Diagnosis Date    Arthritis     Hyperlipidemia     Hypertension        CC: Primary Care Provider: Gus Estevez or Referring Provider: Gus Estevez

## 2025-03-12 NOTE — LETTER
3/12/2025       RE: Chris Kirkland  [address]  [facility] [address]     Dear Colleague,    Thank you for referring your patient, Chris Kirkland, to the [facility] DERMATOLOGY [facility] [place] at [facility]. Please see a copy of my visit note below.    [facility] Dermatology Note  Encounter Date: Mar 12, 2025  Office Visit     Dermatology Problem List:  1. Aks - cryo    ____________________________________________    Assessment & Plan:     #. Actinic keratoses  - Reviewed nature and etiology, and pre-malignant potential  - Monitoring vs cryotherapy. Patient opted for cryotherapy today, see procedure note below. Aftercare reviewed.  - Other treatments include: PDT, Efudex and Calcipotriene, chemical peels, skin resurfacing.  - RX: Effudex + calcipotriene. Apply BID to arms x5 days. Once tolerated and the course of treatment anticipated, would also treat scalp. If unable to fill in cost effective manner through outpatient pharmacy, will send to [facility] pharmacy - pt plans to message via [employer].    - Continue sun protection and sun avoidance. Reviewed physical barriers    Aks treated today:  Right cheek 3  L forehead 1  Scalp 4  Arms 3    # Benign lesions: Seborrheic keratoses, lentigines, cherry angioma, and multiple benign nevi, sebaceous hyperplasia  - Reassurance provided; no treatment is medically necessary    # Stucco keratoses      Procedures Performed:   - Cryotherapy procedure note, location(s):   Right cheek 3  L forehead 1  Scalp 4  Arms 3.   After verbal consent and discussion of risks and benefits including, but not limited to, dyspigmentation/scar, blister, and pain, 11 lesion(s) was(were) treated with 1-2 mm freeze border for 1-2 cycles with liquid nitrogen. Post cryotherapy instructions were provided.  None    Follow-up: 1 year(s) in-person, or earlier for new or changing lesions    Staff:     Luz Ignacio  MD PhD  Dermatology, Dermatopathology    ____________________________________________    CC: Derm Problem (Actinic keratosis: arms and back, using fluocinonide/Lesion of concern: left undereye, and wants thighs checked)      HPI:  Chris Kirkland is a(n) 64 year old female who presents today as a new patient for TBSE and spot checks. Some scattered scaly papules on arms and others. Pt was referred by PCP.   He has some erythematous, scaly plaques on the arms. Was given topical steroid (lidex) by PCP to treat as dermatitis.     Denies prior cryotherapy or field treatment to any keratoses.    Patient is otherwise feeling well, without additional skin concerns.     Labs Reviewed:    Lab Results   Component Value Date    WBC 6.6 01/31/2025    HGB 15.7 01/31/2025    HCT 44.9 01/31/2025     01/31/2025     01/31/2025    POTASSIUM 4.2 01/31/2025    CHLORIDE 104 01/31/2025    CO2 29 01/31/2025    BUN 10.1 01/31/2025    CR 1.03 01/31/2025    GLC 92 01/31/2025    AST 30 01/31/2025    ALT 25 01/31/2025    ALKPHOS 75 01/31/2025    BILITOTAL 0.5 01/31/2025        Physical Exam:  Vitals: There were no vitals taken for this visit.  SKIN: Total skin excluding the undergarment areas was performed. The exam included the head/face, neck, both arms, chest, back, abdomen, both legs, digits and/or nails.   - Bilateral forearms with scattered pink scaly papules as well as white, gritty stuck on papules     - under eye verrucous gritty papule  Gritty pink papules:   Right cheek, L forehead, Scalp some with excoriation, Arms scattered areas    - No other lesions of concern on areas examined.       Medications:  Current Outpatient Medications   Medication Sig Dispense Refill     atorvastatin (LIPITOR) 20 MG tablet Take 1 tablet (20 mg) by mouth daily. 90 tablet 3     fluocinonide (LIDEX) 0.05 % external cream Apply topically 2 times daily. 120 g 3     lisinopril (ZESTRIL) 10 MG tablet Take 1 tablet (10 mg) by mouth at bedtime.  90 tablet 1     Vitamin D3 (CHOLECALCIFEROL) 25 mcg (1000 units) tablet Take by mouth daily       Current Facility-Administered Medications   Medication Dose Route Frequency Provider Last Rate Last Admin     Bupivacaine 0.5 % Injection  1 mL INTRA-ARTICULAR Once Janell Barnhart, KATY          Past Medical History:   Patient Active Problem List   Diagnosis     Cataracts, bilateral     Myopic astigmatism of both eyes     Presbyopia     Hyperlipidemia       Past Medical History:   Diagnosis Date     Arthritis      Hyperlipidemia      Hypertension        CC: Primary Care Provider: Gus Estevez or Referring Provider: Gus Estevez      Again, thank you for allowing me to participate in the care of your patient.      Sincerely,    Luz Ignacio MD

## 2025-03-12 NOTE — NURSING NOTE
Dermatology Rooming Note    Chris Kirkland's goals for this visit include:   Chief Complaint   Patient presents with    Derm Problem     Actinic keratosis: arms and back, using fluocinonide  Lesion of concern: left undereye, and wants thighs checked     Simona Lozada LPN

## 2025-03-17 NOTE — PATIENT INSTRUCTIONS
Actinic Keratosis     Actinic keratosis is a small, rough, raised area on your skin. Often this area has been exposed to the sun for a long period of time.  Some actinic keratoses may develop into a type of skin cancer.  Causes  Actinic keratosis is caused by exposure to sunlight.   You are more likely to develop it if you:  Have fair skin, blue or green eyes, or blond or red hair   Had a kidney or other organ transplant   Take medicines that suppress the immune system   Spend a lot of time each day in the sun (for example, if you work outdoors)   Had many severe sunburns early in life   Are older   Symptoms  Actinic keratosis is usually found on the face, scalp, back of the hands, chest, or places that are often in the sun.   The skin changes begin as flat and scaly areas. They often have a white or yellow crusty scale on top.   The growths may be gray, pink, red, or the same color as your skin. Later they may become hard and wart-like or gritty and rough.   The affected areas may be easier to feel than see.  Exams and Tests  Your health care provider will look at your skin to diagnose this condition. A skin biopsy may be done to see if it is cancer.   Treatment  Some actinic keratoses become squamous cell skin cancer. Have your health care provider look at all skin growths as soon as you find them. Your provider will tell you how to treat them.   Growths may be removed by:  Burning (electrical cautery)   Scraping away the lesion and using electricity to kill any remaining cells (called curettage and electrodesiccation)   Cutting the tumor out and using stitches to place the skin back together (called excision)   Freezing (cryotherapy, which freezes and kills the cells)   If you have many of these skin growths, your doctor may recommend:   A treatment called photodynamic therapy   Chemical peels   Skin creams such as 5-fluorouracil (5-FU) and imiquimod   [place] (Prognosis)  A small number of these skin growths  turn into a type of skin cancer called squamous cell carcinoma.   When to Contact a Medical Professional  Call your health care provider if you see or feel a rough or scaly spot on your skin, or if you notice any other skin changes.  Prevention  The best way to lower your risk for actinic keratosis and skin cancer is to learn how to protect your skin from sun and ultraviolet (UV) light.  Things you can do to lower your exposure to sunlight include:  Wear clothing such as hats, long-sleeved shirts, long skirts, or pants.   Try to avoid being in the sun during midday, when ultraviolet light is most intense.   Use high-quality sunscreens, preferably with a sun protection factor (SPF) rating of at least 15. Pick a sunscreen that blocks both UVA and UVB light.   Apply sunscreen before going out into the sun, and reapply often.   Use sunscreen year-round, including in the winter.   Avoid sun lamps, tanning beds, and tanning salons.   Other things to know about sun exposure:  Sun exposure is stronger in or near surfaces that reflect light, such as water, sand, concrete, and areas painted white.   Sunlight is more intense at the beginning of the summer.   Skin burns faster at higher altitudes.   Alternative Names  Solar keratosis; Sun-induced skin changes - keratosis; Keratosis - actinic(solar)    _______________________________________________________       Cryotherapy    What is it?  Use of a very cold liquid, such as liquid nitrogen, to freeze and destroy abnormal skin cells that need to be removed    What should I expect?  Tenderness and redness  A small blister that might grow and fill with dark purple blood. There may be crusting.  More than one treatment may be needed if the lesions do not go away.    How do I care for the treated area?  Gently wash the area with your hands when bathing.  Use a thin layer of Vaseline to help with healing. You may use a Band-Aid.   The area should heal within 7-10 days and may leave  behind a pink or lighter color.   Do not use an antibiotic or Neosporin ointment.   You may take acetaminophen (Tylenol) for pain.     Call your doctor if you have:  Severe pain  Signs of infection (warmth, redness, cloudy yellow drainage, and or a bad smell)  Questions or concerns    Who should I call with questions?      [facility]: [phone]      [facility]: [phone]      For urgent needs outside of business hours call the [facility] at [phone] and ask for the dermatology resident on call

## 2025-04-22 ENCOUNTER — TELEPHONE (OUTPATIENT)
Dept: GASTROENTEROLOGY | Facility: CLINIC | Age: 65
End: 2025-04-22
Payer: COMMERCIAL

## 2025-04-22 DIAGNOSIS — Z12.11 SPECIAL SCREENING FOR MALIGNANT NEOPLASMS, COLON: Primary | ICD-10-CM

## 2025-04-22 RX ORDER — BISACODYL 5 MG
TABLET, DELAYED RELEASE (ENTERIC COATED) ORAL
Qty: 4 TABLET | Refills: 0 | Status: SHIPPED | OUTPATIENT
Start: 2025-04-22

## 2025-04-22 NOTE — TELEPHONE ENCOUNTER
Attempted to contact patient in order to complete pre assessment questions.     Called the patient but he was unable to complete his pre assessment because he was driving. Left a message to return call to [phone] option 3.    Callback communication sent via [employer].      Jacqui Casanova LPN  Endoscopy Procedure Pre Assessment

## 2025-04-22 NOTE — TELEPHONE ENCOUNTER
Caller: Chris Kirkland     Reason for Reschedule/Cancellation (please be detailed, any staff messages or encounters to note?):   Conflict with patient transportation    Did you cancel or rescheduled an EUS procedure? No.    Is screening questionnaire older than 3 months from the reschedule date.   If Yes, please complete screening questionnaire. No    Prior to reschedule please review:  Ordering Provider: SUNITA DORANTES   Sedation Determined: CS  Does patient have any ASC Exclusions, please identify?: N    Notes on Cancelled Procedure:  Procedure: Lower Endoscopy [Colonoscopy]   Date: 5/5  Location: [facility]; [address] [place] [address], [address] [place] [address]  Surgeon: Dalia    Rescheduled: Yes,   Procedure: Lower Endoscopy [Colonoscopy]    Date: 6/18   Location: [facility]; [address] [place] [address], [address] [place] [address]   Surgeon: Leventhal   Sedation Level Scheduled  CS ,  Reason for Sedation Level PER ORDER   Instructions updated and sent: Y     Does patient need PAC or Pre -Op Rescheduled? : N

## 2025-04-22 NOTE — TELEPHONE ENCOUNTER
Pre visit planning completed.      Procedure details:    Patient scheduled for Colonoscopy on 5/5/25.     Arrival time: 1000. Procedure time 1100    Facility location: [facility]; [address] [place] [address], 5th Floor, [place] [address]. Check in location: 5th Floor.    Sedation type: Conscious sedation     Pre op exam needed? No.    Indication for procedure: history of polyps      Chart review:     Electronic implanted devices? No    Recent diagnosis of diverticulitis within the last 6 weeks? No      Medication review:    Diabetic? No    Anticoagulants? No    Weight loss medication/injectable? No GLP-1 medication per patient's medication list. Nursing to verify with pre-assessment call.    Other medication HOLDING recommendations:  N/A      Prep for procedure:     Bowel prep recommendation: Standard Golytely. Bowel prep sent to [id number] - [place], [place] - [address] [facility]  Due to:  same prep that was done last time with good outcome    Procedure information and instructions sent via natalia De La Torre RN  Endoscopy Procedure Pre Assessment   [phone] option 3

## 2025-05-22 ENCOUNTER — TELEPHONE (OUTPATIENT)
Dept: GASTROENTEROLOGY | Facility: CLINIC | Age: 65
End: 2025-05-22
Payer: COMMERCIAL

## 2025-05-22 NOTE — TELEPHONE ENCOUNTER
Rescheduled Colonoscopy  Due to scheduling conflict and no ride.    Pre visit planning completed.      Procedure details:    Patient scheduled for Colonoscopy on 6/4/25.     Arrival time: 0645. Procedure time 0745    Facility location: [facility]; [address] [place] [address], 5th Floor, [place] [address]. Check in location: 5th Floor.    Sedation type: Conscious sedation     Pre op exam needed? No.    Indication for procedure: history of polyps      Chart review:     Electronic implanted devices? No    Recent diagnosis of diverticulitis within the last 6 weeks? No      Medication review:    Diabetic? No    Anticoagulants? No    Weight loss medication/injectable? No GLP-1 medication per patient's medication list. Nursing to verify with pre-assessment call.    Other medication HOLDING recommendations:  N/A      Prep for procedure:     Bowel prep recommendation: Standard Golytely. Bowel prep sent to [id number] - [place], [place] - [address]   Due to: prep that was completed with last colonoscopy, good outcome    Prep prescription sent previously, did patient ?    Prep instructions sent via [employer]         Mariah De La Torre RN  Endoscopy Procedure Pre Assessment   [phone] option 3

## 2025-05-22 NOTE — TELEPHONE ENCOUNTER
Attempted to contact patient in order to complete pre assessment questions.     Called the patient to do his pre assessment but it was not a good time for him. Left a message to return call to [phone] option 3.    Callback communication sent via [employer].      Jacqui Casanova LPN  Endoscopy Procedure Pre Assessment

## 2025-05-27 NOTE — TELEPHONE ENCOUNTER
Pre assessment completed for upcoming procedure.   (Please see previous telephone encounter notes for complete details)    Procedure details:    Arrival time and facility location reviewed.    Pre op exam needed? No.    Designated  policy reviewed. Instructed to have someone stay 6  hours post procedure.     Medication review:    Medications reviewed. Please see supporting documentation below. Holding recommendations discussed (if applicable).     Prep for procedure:     Procedure prep instructions reviewed.      Any additional information needed:  N/A    Patient verbalized understanding and had no questions or concerns at this time.      Jacqui Casanova LPN  Endoscopy Procedure Pre Assessment   [phone] option 3

## 2025-05-29 NOTE — TELEPHONE ENCOUNTER
The Pt called to see if the script for Golytely had been sent in to [facility]. I did inform him it has, and he can call the pharmacy and let them know it is ready for  now.     Annmarie Olivarez, RN   Endoscopy Procedure Pre Assessment RN

## 2025-06-03 ENCOUNTER — ANESTHESIA EVENT (OUTPATIENT)
Dept: SURGERY | Facility: AMBULATORY SURGERY CENTER | Age: 65
End: 2025-06-03
Payer: COMMERCIAL

## 2025-06-03 DIAGNOSIS — I10 BENIGN ESSENTIAL HYPERTENSION: ICD-10-CM

## 2025-06-03 NOTE — ANESTHESIA PREPROCEDURE EVALUATION
"Anesthesia Pre-Procedure Evaluation    Patient: Chris Kirkland   MRN: [id number] : 1960          Procedure : Procedure(s):  Colonoscopy, Screening, High Risk         Past Medical History:   Diagnosis Date    Arthritis     Hyperlipidemia     Hypertension       Past Surgical History:   Procedure Laterality Date    COLONOSCOPY N/A 2019    Procedure: COLONOSCOPY, WITH POLYPECTOMY;  Surgeon: Luis Gustafson MD;  Location: [facility] OR    NO HISTORY OF SURGERY        Allergies   Allergen Reactions    Penicillins      Pt reports family history and no reaction to this in the past      Social History     Tobacco Use    Smoking status: Former     Current packs/day: 0.00     Types: Cigarettes, Cigars     Quit date: 1977     Years since quittin.7    Smokeless tobacco: Never   Substance Use Topics    Alcohol use: Yes     Comment: 1 drink a few times per week, 2 on weekends      Wt Readings from Last 1 Encounters:   25 90.9 kg (200 lb 4.8 oz)             Physical Exam  Airway  Mallampati: I  TM distance: >3 FB  Neck ROM: full  Mouth opening: >= 4 cm    Cardiovascular - normal exam   Dental   (+) Minor Abnormalities - some fillings, tiny chips      Pulmonary - normal exam      Neurological - normal exam  He appears awake, alert and oriented x3.    Other Findings       OUTSIDE LABS:  CBC:   Lab Results   Component Value Date    WBC 6.6 2025    WBC 4.8 2024    HGB 15.7 2025    HGB 15.3 2024    HCT 44.9 2025    HCT 44.2 2024     2025     2024     BMP:   Lab Results   Component Value Date     2025     2024    POTASSIUM 4.2 2025    POTASSIUM 4.7 2024    CHLORIDE 104 2025    CHLORIDE 106 2024    CO2 29 2025    CO2 27 2024    BUN 10.1 2025    BUN 8.6 2024    CR 1.03 2025    CR 0.97 2024    GLC 92 2025    GLC 87 2024     COAGS: No results found for: \"PTT\", " "\"INR\", \"FIBR\"  POC: No results found for: \"BGM\", \"HCG\", \"HCGS\"  HEPATIC:   Lab Results   Component Value Date    ALBUMIN 4.2 01/31/2025    PROTTOTAL 6.6 01/31/2025    ALT 25 01/31/2025    AST 30 01/31/2025    ALKPHOS 75 01/31/2025    BILITOTAL 0.5 01/31/2025     OTHER:   Lab Results   Component Value Date    A1C 5.3 01/31/2025    ALPHONSE 9.6 01/31/2025    MAG 2.3 05/23/2024       Anesthesia Plan    ASA Status:  2      NPO Status: NPO Appropriate   Anesthesia Type: MAC.  Airway: natural airway.  Induction: intravenous.  Maintenance: TIVA.   Techniques and Equipment:       - Monitoring Plan: standard ASA monitoring     Consents    Anesthesia Plan(s) and associated risks, benefits, and realistic alternatives discussed. Questions answered and patient/representative(s) expressed understanding.     - Discussed:     - Discussed with:  Patient        - Pt is DNR/DNI Status: no DNR          Postoperative Care         Comments:                   Marcellus Monroy MD    I have reviewed the pertinent notes and labs in the chart from the past 30 days and (re)examined the patient.  Any updates or changes from those notes are reflected in this note.    Clinically Significant Risk Factors Present on Admission                                              "

## 2025-06-04 ENCOUNTER — HOSPITAL ENCOUNTER (OUTPATIENT)
Facility: AMBULATORY SURGERY CENTER | Age: 65
Discharge: HOME | End: 2025-06-04
Attending: INTERNAL MEDICINE | Admitting: INTERNAL MEDICINE
Payer: COMMERCIAL

## 2025-06-04 ENCOUNTER — ANESTHESIA (OUTPATIENT)
Dept: SURGERY | Facility: AMBULATORY SURGERY CENTER | Age: 65
End: 2025-06-04
Payer: COMMERCIAL

## 2025-06-04 VITALS
SYSTOLIC BLOOD PRESSURE: 121 MMHG | HEART RATE: 58 BPM | TEMPERATURE: 96.5 F | DIASTOLIC BLOOD PRESSURE: 62 MMHG | RESPIRATION RATE: 15 BRPM | OXYGEN SATURATION: 96 %

## 2025-06-04 DIAGNOSIS — Z12.11 COLON CANCER SCREENING: Primary | ICD-10-CM

## 2025-06-04 LAB — COLONOSCOPY: NORMAL

## 2025-06-04 PROCEDURE — 88305 TISSUE EXAM BY PATHOLOGIST: CPT | Mod: TC | Performed by: INTERNAL MEDICINE

## 2025-06-04 PROCEDURE — 88305 TISSUE EXAM BY PATHOLOGIST: CPT | Mod: 26 | Performed by: PATHOLOGY

## 2025-06-04 RX ORDER — PROCHLORPERAZINE MALEATE 5 MG/1
5 TABLET ORAL EVERY 6 HOURS PRN
Status: DISCONTINUED | OUTPATIENT
Start: 2025-06-04 | End: 2025-06-05 | Stop reason: HOSPADM

## 2025-06-04 RX ORDER — PROPOFOL 10 MG/ML
INJECTION, EMULSION INTRAVENOUS CONTINUOUS PRN
Status: DISCONTINUED | OUTPATIENT
Start: 2025-06-04 | End: 2025-06-04

## 2025-06-04 RX ORDER — NALOXONE HYDROCHLORIDE 0.4 MG/ML
0.2 INJECTION, SOLUTION INTRAMUSCULAR; INTRAVENOUS; SUBCUTANEOUS
Status: DISCONTINUED | OUTPATIENT
Start: 2025-06-04 | End: 2025-06-05 | Stop reason: HOSPADM

## 2025-06-04 RX ORDER — PROPOFOL 10 MG/ML
INJECTION, EMULSION INTRAVENOUS PRN
Status: DISCONTINUED | OUTPATIENT
Start: 2025-06-04 | End: 2025-06-04

## 2025-06-04 RX ORDER — LIDOCAINE HYDROCHLORIDE 20 MG/ML
INJECTION, SOLUTION INFILTRATION; PERINEURAL PRN
Status: DISCONTINUED | OUTPATIENT
Start: 2025-06-04 | End: 2025-06-04

## 2025-06-04 RX ORDER — NALOXONE HYDROCHLORIDE 0.4 MG/ML
0.4 INJECTION, SOLUTION INTRAMUSCULAR; INTRAVENOUS; SUBCUTANEOUS
Status: DISCONTINUED | OUTPATIENT
Start: 2025-06-04 | End: 2025-06-05 | Stop reason: HOSPADM

## 2025-06-04 RX ORDER — LIDOCAINE 40 MG/G
CREAM TOPICAL
Status: DISCONTINUED | OUTPATIENT
Start: 2025-06-04 | End: 2025-06-04 | Stop reason: HOSPADM

## 2025-06-04 RX ORDER — ONDANSETRON 4 MG/1
4 TABLET, ORALLY DISINTEGRATING ORAL EVERY 6 HOURS PRN
Status: DISCONTINUED | OUTPATIENT
Start: 2025-06-04 | End: 2025-06-05 | Stop reason: HOSPADM

## 2025-06-04 RX ORDER — LISINOPRIL 10 MG/1
10 TABLET ORAL AT BEDTIME
Qty: 90 TABLET | Refills: 2 | Status: SHIPPED | OUTPATIENT
Start: 2025-06-04

## 2025-06-04 RX ORDER — ONDANSETRON 2 MG/ML
4 INJECTION INTRAMUSCULAR; INTRAVENOUS
Status: DISCONTINUED | OUTPATIENT
Start: 2025-06-04 | End: 2025-06-04 | Stop reason: HOSPADM

## 2025-06-04 RX ORDER — FLUMAZENIL 0.1 MG/ML
0.2 INJECTION, SOLUTION INTRAVENOUS
Status: ACTIVE | OUTPATIENT
Start: 2025-06-04 | End: 2025-06-04

## 2025-06-04 RX ORDER — ONDANSETRON 2 MG/ML
4 INJECTION INTRAMUSCULAR; INTRAVENOUS EVERY 6 HOURS PRN
Status: DISCONTINUED | OUTPATIENT
Start: 2025-06-04 | End: 2025-06-05 | Stop reason: HOSPADM

## 2025-06-04 RX ORDER — SODIUM CHLORIDE, SODIUM LACTATE, POTASSIUM CHLORIDE, CALCIUM CHLORIDE 600; 310; 30; 20 MG/100ML; MG/100ML; MG/100ML; MG/100ML
INJECTION, SOLUTION INTRAVENOUS CONTINUOUS PRN
Status: DISCONTINUED | OUTPATIENT
Start: 2025-06-04 | End: 2025-06-04

## 2025-06-04 RX ADMIN — SODIUM CHLORIDE, SODIUM LACTATE, POTASSIUM CHLORIDE, CALCIUM CHLORIDE: 600; 310; 30; 20 INJECTION, SOLUTION INTRAVENOUS at 07:43

## 2025-06-04 RX ADMIN — PROPOFOL 50 MG: 10 INJECTION, EMULSION INTRAVENOUS at 07:47

## 2025-06-04 RX ADMIN — PROPOFOL 50 MG: 10 INJECTION, EMULSION INTRAVENOUS at 07:49

## 2025-06-04 RX ADMIN — LIDOCAINE HYDROCHLORIDE 100 MG: 20 INJECTION, SOLUTION INFILTRATION; PERINEURAL at 07:46

## 2025-06-04 RX ADMIN — PROPOFOL 200 MCG/KG/MIN: 10 INJECTION, EMULSION INTRAVENOUS at 07:47

## 2025-06-04 NOTE — TELEPHONE ENCOUNTER
Last Written Prescription:  lisinopril (ZESTRIL) 10 MG tablet 90 tablet 1 12/13/2024     ----------------------  Last Visit Date: 2/5/25  Future Visit Date: none  ----------------------    Refill decision: Medication refilled per  Medication Refill in Ambulatory Care  policy.    Request from pharmacy:  Requested Prescriptions   Pending Prescriptions Disp Refills    lisinopril (ZESTRIL) 10 MG tablet [Pharmacy Med Name: Lisinopril Oral Tablet 10 MG] 90 tablet 0     Sig: Take 1 tablet (10 mg) by mouth at bedtime.       ACE Inhibitors (Including Combos) Protocol Passed - 6/4/2025  4:25 PM        Passed - Most recent blood pressure under 140/90 in past 12 months- Clinicial or Patient Reported     BP Readings from Last 3 Encounters:   06/04/25 121/62   02/05/25 127/89   06/24/24 125/85       No data recorded            Passed - Medication is active on med list and the sig matches. RN to manually verify dose and sig if red X/fail.     If the protocol passes (green check), you do not need to verify med dose and sig.    A prescription matches if they are the same clinical intention.    For Example: once daily and every morning are the same.    The protocol can not identify upper and lower case letters as matching and will fail.     For Example: Take 1 tablet (50 mg) by mouth daily     TAKE 1 TABLET (50 MG) BY MOUTH DAILY    For all fails (red x), verify dose and sig.    If the refill does match what is on file, the RN can still proceed to approve the refill request.       If they do not match, route to the appropriate provider.             Passed - Medication indicated for associated diagnosis     Medication is associated with one or more of the following diagnoses:     Chronic Kidney Disease (CKD)   Coronary Artery Disease (CAD)   Diabetes   Heart Failure (HF)   Hypertension (HTN)   Nephropathy   History of myocarditis   Tachycardia induced cardiomyopathy   STEMI (ST elevation myocardial infarction)   Spontaneous dissection  of coronary artery   Status post percutaneous transluminal coronary angioplasty   Cardiomyopathy            Passed - Recent (12 month) or future (90 days) visit with authorizing provider's specialty (provided they have been seen in the past 15 months)     The patient must have completed an in-person or virtual visit within the past 12 months or has a future visit scheduled within the next 90 days with the authorizing provider s specialty.  Urgent care and e-visits do not qualify as an office visit for this protocol.          Passed - Most recent GFR on file in the past 12 months >30        Passed - Patient is age 18 or older        Passed - Normal serum potassium on file in past 12 months     Recent Labs   Lab Test 01/31/25  0859   POTASSIUM 4.2

## 2025-06-04 NOTE — H&P
Chris Kirkland  [id number]  male  65 year old      Reason for procedure/surgery: polyp surveillance    Patient Active Problem List   Diagnosis    Cataracts, bilateral    Myopic astigmatism of both eyes    Presbyopia    Hyperlipidemia       Past Surgical History:    Past Surgical History:   Procedure Laterality Date    COLONOSCOPY N/A 2019    Procedure: COLONOSCOPY, WITH POLYPECTOMY;  Surgeon: Luis Gustafson MD;  Location: [facility] OR    NO HISTORY OF SURGERY         Past Medical History:   Past Medical History:   Diagnosis Date    Arthritis     Hyperlipidemia     Hypertension        Social History:   Social History     Tobacco Use    Smoking status: Former     Current packs/day: 0.00     Types: Cigarettes, Cigars     Quit date: 1977     Years since quittin.7    Smokeless tobacco: Never   Substance Use Topics    Alcohol use: Yes     Comment: 1 drink a few times per week, 2 on weekends       Family History:   Family History   Problem Relation Age of Onset    Parkinsonism Mother     Cancer Mother         thyroid    Thyroid Disease Mother          age 87    Heart Failure Father     Sleep Apnea Brother     Obesity Brother         Alive at age 67    Diabetes Brother     Parkinsonism Sister     Diabetes Brother     Hyperlipidemia Sister         on statin    Breast Cancer Sister     Colon Cancer Other         Uncle    Glaucoma No family hx of     Macular Degeneration No family hx of     Eye Surgery No family hx of     Retinal detachment No family hx of        Allergies:   Allergies   Allergen Reactions    Penicillins      Pt reports family history and no reaction to this in the past       Active Medications:   Current Outpatient Medications   Medication Sig Dispense Refill    atorvastatin (LIPITOR) 20 MG tablet Take 1 tablet (20 mg) by mouth daily. 90 tablet 3    bisacodyl (DULCOLAX) 5 MG EC tablet Take 2 tablets at 3 pm the day before your procedure. If your procedure is before 11 am, take 2 additional  tablets at 11 pm. If your procedure is after 11 am, take 2 additional tablets at 6 am. For additional instructions refer to your colonoscopy prep instructions. 4 tablet 0    calcipotriene (DOVONOX) 0.005 % external ointment Mix Efudex + calcipotriene equally. Apply BID x 4-10 days. Stop when skin gets red. 90 g 0    fluocinonide (LIDEX) 0.05 % external cream Apply topically 2 times daily. 120 g 3    fluorouracil (EFUDEX) 5 % external cream Mix Efudex + calcipotriene equally. Apply BID x 4-10 days. Stop when skin gets red. 40 g 0    lisinopril (ZESTRIL) 10 MG tablet Take 1 tablet (10 mg) by mouth at bedtime. 90 tablet 1    polyethylene glycol (GOLYTELY) 236 g suspension The night before the exam at 6 pm drink an 8-ounce glass every 15 minutes until the jug is half empty. If you arrive before 11 AM: Drink the other half of the Golytely jug at 11 PM night before procedure. If you arrive after 11 AM: Drink the other half of the Golytely jug at 6 AM day of procedure. For additional instructions refer to your colonoscopy prep instructions. 4000 mL 0    Vitamin D3 (CHOLECALCIFEROL) 25 mcg (1000 units) tablet Take by mouth daily         Systemic Review:   CONSTITUTIONAL: NEGATIVE for fever, chills, change in weight  ENT/MOUTH: NEGATIVE for ear, mouth and throat problems  RESP: NEGATIVE for significant cough or SOB  CV: NEGATIVE for chest pain, palpitations or peripheral edema    Physical Examination:   Vital Signs: /86   Pulse 71   Temp (!) 96.5  F (35.8  C) (Temporal)   Resp 16   SpO2 95%   GENERAL: healthy, alert and no distress  RESP: Normal respiratory excursion   CV: regular rates and rhythm and no peripheral edema  ABDOMEN: soft, nontender and bowel sounds normal  MS: no gross musculoskeletal defects noted, no edema    Plan: Appropriate to proceed as scheduled.      Thomas M. Leventhal, MD  6/4/2025    PCP:  Gus Estevez

## 2025-06-04 NOTE — DISCHARGE INSTRUCTIONS
Discharge Instructions after Colonoscopy  or Sigmoidoscopy    Today you had a Colonoscopy    Activity and Diet  You were given medicine for pain. You may be dizzy or sleepy.  For 24 hours:   Do not drive or use heavy equipment.   Do not make important decisions.   Do not drink any alcohol.  You may return to your normal diet and medicines.    Discomfort   Air was placed in your colon during the exam in order to see it. Walking helps to pass the air.   You may take Tylenol (acetaminophen) for pain unless your doctor has told you not to.  Do not take aspirin or ibuprofen (Advil, Motrin, or other anti-inflammatory  drugs) for _____ days.    Follow-up  ____ We took small tissue samples or polyps to study. Your doctor will call you with the results  within two weeks.    When to call:    Call right away if you have:   Unusual pain in belly or chest pain not relieved with passing air.   More than 1 to 2 Tablespoons of bleeding from your rectum.   Fever above 100.6  F (37.5  C).    If you have severe pain, bleeding, or shortness of breath, go to an emergency room.    If you have questions, call:  Monday to Friday, 8 a.m. to 4:30 p.m.  Central Scheduling Department: [phone]    After hours  Hospital: [phone] (Ask for the GI fellow on call)

## 2025-06-04 NOTE — ANESTHESIA CARE TRANSFER NOTE
Patient: Chris Kirkland    Procedure: Procedure(s):  COLONOSCOPY, WITH POLYPECTOMY       Diagnosis: Special screening for malignant neoplasms, colon [Z12.11]  Diagnosis Additional Information: No value filed.    Anesthesia Type:   MAC     Note:    Oropharynx: oropharynx clear of all foreign objects and spontaneously breathing  Level of Consciousness: drowsy  Oxygen Supplementation: room air    Independent Airway: airway patency satisfactory and stable  Dentition: dentition unchanged  Vital Signs Stable: post-procedure vital signs reviewed and stable  Report to RN Given: handoff report given  Patient transferred to: Phase II    Handoff Report: Identifed the Patient, Identified the Reponsible Provider, Reviewed the pertinent medical history, Discussed the surgical course, Reviewed Intra-OP anesthesia mangement and issues during anesthesia, Set expectations for post-procedure period and Allowed opportunity for questions and acknowledgement of understanding      Vitals:  Vitals Value Taken Time   /76    Temp 96.2    Pulse 69    Resp 18    SpO2 96 06/04/25 08:09   Vitals shown include unfiled device data.    Electronically Signed By: LUCIANO Louise CRNA  June 4, 2025  8:10 AM

## 2025-06-04 NOTE — ANESTHESIA POSTPROCEDURE EVALUATION
Patient: Chris Kirkland    Procedure: Procedure(s):  COLONOSCOPY, WITH POLYPECTOMY       Anesthesia Type:  MAC    Note:  Disposition: Outpatient   Postop Pain Control: Uneventful            Sign Out: Well controlled pain   PONV: No   Neuro/Psych: Uneventful            Sign Out: Acceptable/Baseline neuro status   Airway/Respiratory: Uneventful            Sign Out: Acceptable/Baseline resp. status   CV/Hemodynamics: Uneventful            Sign Out: Acceptable CV status; No obvious hypovolemia; No obvious fluid overload   Other NRE:    DID A NON-ROUTINE EVENT OCCUR?            Last vitals:  Vitals Value Taken Time   /62 06/04/25 08:30   Temp     Pulse 58 06/04/25 08:30   Resp 15 06/04/25 08:30   SpO2 95 % 06/04/25 08:33   Vitals shown include unfiled device data.    Electronically Signed By: Marcellus Monroy MD  June 4, 2025  9:47 AM

## 2025-06-05 LAB
PATH REPORT.COMMENTS IMP SPEC: NORMAL
PATH REPORT.COMMENTS IMP SPEC: NORMAL
PATH REPORT.FINAL DX SPEC: NORMAL
PATH REPORT.GROSS SPEC: NORMAL
PATH REPORT.MICROSCOPIC SPEC OTHER STN: NORMAL
PATH REPORT.RELEVANT HX SPEC: NORMAL
PHOTO IMAGE: NORMAL

## 2025-06-06 ENCOUNTER — RESULTS FOLLOW-UP (OUTPATIENT)
Dept: GASTROENTEROLOGY | Facility: CLINIC | Age: 65
End: 2025-06-06

## 2025-06-18 PROBLEM — D12.6 ADENOMATOUS POLYP OF COLON: Status: ACTIVE | Noted: 2025-06-18

## 2025-06-19 ENCOUNTER — PATIENT OUTREACH (OUTPATIENT)
Dept: CARE COORDINATION | Facility: CLINIC | Age: 65
End: 2025-06-19
Payer: COMMERCIAL

## 2025-06-23 ENCOUNTER — PATIENT OUTREACH (OUTPATIENT)
Dept: CARE COORDINATION | Facility: CLINIC | Age: 65
End: 2025-06-23
Payer: COMMERCIAL

## (undated) DEVICE — KIT ENDO TURNOVER/PROCEDURE CARRY-ON 101822

## (undated) DEVICE — SUCTION MANIFOLD NEPTUNE 2 SYS 1 PORT 702-025-000

## (undated) DEVICE — SOL WATER IRRIG 1000ML BOTTLE 2F7114

## (undated) DEVICE — GOWN IMPERVIOUS 2XL BLUE

## (undated) DEVICE — GOWN ISOLATION YELLOW UNIV NOT STERILE 3110PG

## (undated) DEVICE — KIT ENDO FIRST STEP DISINFECTANT 200ML W/POUCH EP-4

## (undated) DEVICE — TUBING SUCTION 12"X1/4" N612

## (undated) DEVICE — ENDO SNARE EXACTO COLD 9MM LOOP 2.4MMX230CM 00711115

## (undated) DEVICE — FCP BIOPSY RADIAL JAW 4 JUMBO 3.2MM CHANNEL M00513370

## (undated) DEVICE — ENDO TRAP POLYP E-TRAP 00711099

## (undated) DEVICE — SOL WATER IRRIG 500ML BOTTLE 2F7113

## (undated) DEVICE — SPECIMEN CONTAINER 3OZ W/FORMALIN 59901

## (undated) DEVICE — TUBING SUCTION MEDI-VAC 1/4"X20' N620A

## (undated) RX ORDER — DIPHENHYDRAMINE HYDROCHLORIDE 50 MG/ML
INJECTION INTRAMUSCULAR; INTRAVENOUS
Status: DISPENSED
Start: 2019-11-25

## (undated) RX ORDER — METOPROLOL TARTRATE 25 MG/1
TABLET, FILM COATED ORAL
Status: DISPENSED
Start: 2019-11-20

## (undated) RX ORDER — ONDANSETRON 2 MG/ML
INJECTION INTRAMUSCULAR; INTRAVENOUS
Status: DISPENSED
Start: 2019-11-25

## (undated) RX ORDER — FENTANYL CITRATE 50 UG/ML
INJECTION, SOLUTION INTRAMUSCULAR; INTRAVENOUS
Status: DISPENSED
Start: 2019-11-25

## (undated) RX ORDER — METOPROLOL TARTRATE 1 MG/ML
INJECTION, SOLUTION INTRAVENOUS
Status: DISPENSED
Start: 2019-11-20

## (undated) RX ORDER — NITROGLYCERIN 0.4 MG/1
TABLET SUBLINGUAL
Status: DISPENSED
Start: 2019-11-20

## (undated) RX ORDER — METOPROLOL TARTRATE 50 MG
TABLET ORAL
Status: DISPENSED
Start: 2019-11-20